# Patient Record
Sex: MALE | Race: WHITE | NOT HISPANIC OR LATINO | Employment: FULL TIME | URBAN - METROPOLITAN AREA
[De-identification: names, ages, dates, MRNs, and addresses within clinical notes are randomized per-mention and may not be internally consistent; named-entity substitution may affect disease eponyms.]

---

## 2017-05-02 ENCOUNTER — LAB VISIT (OUTPATIENT)
Dept: LAB | Facility: OTHER | Age: 27
End: 2017-05-02
Attending: FAMILY MEDICINE
Payer: COMMERCIAL

## 2017-05-02 ENCOUNTER — OFFICE VISIT (OUTPATIENT)
Dept: INTERNAL MEDICINE | Facility: CLINIC | Age: 27
End: 2017-05-02
Attending: FAMILY MEDICINE
Payer: COMMERCIAL

## 2017-05-02 VITALS
SYSTOLIC BLOOD PRESSURE: 122 MMHG | HEIGHT: 69 IN | DIASTOLIC BLOOD PRESSURE: 82 MMHG | BODY MASS INDEX: 23.22 KG/M2 | WEIGHT: 156.75 LBS

## 2017-05-02 DIAGNOSIS — L98.9 SKIN LESION: ICD-10-CM

## 2017-05-02 DIAGNOSIS — Z13.220 ENCOUNTER FOR LIPID SCREENING FOR CARDIOVASCULAR DISEASE: ICD-10-CM

## 2017-05-02 DIAGNOSIS — L70.9 ACNE, UNSPECIFIED ACNE TYPE: ICD-10-CM

## 2017-05-02 DIAGNOSIS — Z13.6 ENCOUNTER FOR LIPID SCREENING FOR CARDIOVASCULAR DISEASE: ICD-10-CM

## 2017-05-02 DIAGNOSIS — F98.8 ADD (ATTENTION DEFICIT DISORDER): ICD-10-CM

## 2017-05-02 DIAGNOSIS — Z00.00 ANNUAL PHYSICAL EXAM: ICD-10-CM

## 2017-05-02 DIAGNOSIS — Z00.00 ANNUAL PHYSICAL EXAM: Primary | ICD-10-CM

## 2017-05-02 LAB
25(OH)D3+25(OH)D2 SERPL-MCNC: 44 NG/ML
ALBUMIN SERPL BCP-MCNC: 4.6 G/DL
ALP SERPL-CCNC: 62 U/L
ALT SERPL W/O P-5'-P-CCNC: 22 U/L
ANION GAP SERPL CALC-SCNC: 8 MMOL/L
AST SERPL-CCNC: 16 U/L
BASOPHILS # BLD AUTO: 0.01 K/UL
BASOPHILS NFR BLD: 0.2 %
BILIRUB SERPL-MCNC: 1 MG/DL
BUN SERPL-MCNC: 13 MG/DL
CALCIUM SERPL-MCNC: 9.7 MG/DL
CHLORIDE SERPL-SCNC: 104 MMOL/L
CHOLEST/HDLC SERPL: 3 {RATIO}
CO2 SERPL-SCNC: 29 MMOL/L
CREAT SERPL-MCNC: 1 MG/DL
DIFFERENTIAL METHOD: ABNORMAL
EOSINOPHIL # BLD AUTO: 0.1 K/UL
EOSINOPHIL NFR BLD: 1.8 %
ERYTHROCYTE [DISTWIDTH] IN BLOOD BY AUTOMATED COUNT: 12.7 %
EST. GFR  (AFRICAN AMERICAN): >60 ML/MIN/1.73 M^2
EST. GFR  (NON AFRICAN AMERICAN): >60 ML/MIN/1.73 M^2
GLUCOSE SERPL-MCNC: 87 MG/DL
HCT VFR BLD AUTO: 45.1 %
HDL/CHOLESTEROL RATIO: 33.1 %
HDLC SERPL-MCNC: 175 MG/DL
HDLC SERPL-MCNC: 58 MG/DL
HGB BLD-MCNC: 15.7 G/DL
LDLC SERPL CALC-MCNC: 101.6 MG/DL
LYMPHOCYTES # BLD AUTO: 1.3 K/UL
LYMPHOCYTES NFR BLD: 24.3 %
MCH RBC QN AUTO: 31.8 PG
MCHC RBC AUTO-ENTMCNC: 34.8 %
MCV RBC AUTO: 92 FL
MONOCYTES # BLD AUTO: 0.4 K/UL
MONOCYTES NFR BLD: 7.2 %
NEUTROPHILS # BLD AUTO: 3.7 K/UL
NEUTROPHILS NFR BLD: 66.3 %
NONHDLC SERPL-MCNC: 117 MG/DL
PLATELET # BLD AUTO: 295 K/UL
PMV BLD AUTO: 9.4 FL
POTASSIUM SERPL-SCNC: 4.5 MMOL/L
PROT SERPL-MCNC: 8.1 G/DL
RBC # BLD AUTO: 4.93 M/UL
SODIUM SERPL-SCNC: 141 MMOL/L
TRIGL SERPL-MCNC: 77 MG/DL
WBC # BLD AUTO: 5.52 K/UL

## 2017-05-02 PROCEDURE — 86703 HIV-1/HIV-2 1 RESULT ANTBDY: CPT

## 2017-05-02 PROCEDURE — 99999 PR PBB SHADOW E&M-EST. PATIENT-LVL III: CPT | Mod: PBBFAC,,, | Performed by: FAMILY MEDICINE

## 2017-05-02 PROCEDURE — 80074 ACUTE HEPATITIS PANEL: CPT

## 2017-05-02 PROCEDURE — 80053 COMPREHEN METABOLIC PANEL: CPT

## 2017-05-02 PROCEDURE — 82306 VITAMIN D 25 HYDROXY: CPT

## 2017-05-02 PROCEDURE — 86695 HERPES SIMPLEX TYPE 1 TEST: CPT

## 2017-05-02 PROCEDURE — 80061 LIPID PANEL: CPT

## 2017-05-02 PROCEDURE — 86696 HERPES SIMPLEX TYPE 2 TEST: CPT | Mod: 59

## 2017-05-02 PROCEDURE — 87591 N.GONORRHOEAE DNA AMP PROB: CPT

## 2017-05-02 PROCEDURE — 99385 PREV VISIT NEW AGE 18-39: CPT | Mod: S$GLB,,, | Performed by: FAMILY MEDICINE

## 2017-05-02 PROCEDURE — 36415 COLL VENOUS BLD VENIPUNCTURE: CPT

## 2017-05-02 PROCEDURE — 86695 HERPES SIMPLEX TYPE 1 TEST: CPT | Mod: 59

## 2017-05-02 PROCEDURE — 85025 COMPLETE CBC W/AUTO DIFF WBC: CPT

## 2017-05-02 PROCEDURE — 86592 SYPHILIS TEST NON-TREP QUAL: CPT

## 2017-05-02 RX ORDER — LISDEXAMFETAMINE DIMESYLATE 30 MG/1
30 CAPSULE ORAL EVERY MORNING
Qty: 30 CAPSULE | Refills: 0 | Status: SHIPPED | OUTPATIENT
Start: 2017-05-02 | End: 2017-05-30 | Stop reason: SDUPTHER

## 2017-05-02 NOTE — PROGRESS NOTES
"Subjective:      Patient ID: Sheldon Sanon is a 26 y.o. male.    Chief Complaint: Establish Care and Exposure to STD    HPI  Review of Systems  I personally reviewed Past Medical History, Past Surgical history,  Past Social History and Family History    Objective:   Ht 5' 9" (1.753 m)  Wt 71.1 kg (156 lb 12 oz)  BMI 23.15 kg/m2    Physical Exam    Sheldon was seen today for establish care and exposure to std.    Diagnoses and all orders for this visit:    Encounter for lipid screening for cardiovascular disease  -     Lipid panel; Future      "

## 2017-05-02 NOTE — PROGRESS NOTES
"Subjective:      Patient ID: Sheldon Sanon is a 26 y.o. male.    Chief Complaint: Establish Care and Exposure to STD    HPI Comments: He has noticed a rash in his genital area, noticed a blister papule that was painful, in the last three days. He did pop it and another one came up that was similar. He denies fevers or malaise. In the last few weeks his mood has been good, no panic attacks, no SI or HI.     Exposure to STD   The patient's pertinent negatives include no penile discharge, penile pain, scrotal swelling or testicular pain. Pertinent negatives include no dysuria or flank pain.     Review of Systems   Constitutional: Negative.    Respiratory: Negative.    Cardiovascular: Negative.    Gastrointestinal: Negative.    Genitourinary: Positive for genital sores. Negative for discharge, dysuria, flank pain, penile pain, penile swelling, scrotal swelling and testicular pain.     I personally reviewed Past Medical History, Past Surgical history,  Past Social History and Family History    Objective:   /82  Ht 5' 9" (1.753 m)  Wt 71.1 kg (156 lb 12 oz)  BMI 23.15 kg/m2    Physical Exam   Constitutional: He is oriented to person, place, and time. He appears well-developed and well-nourished. No distress.   HENT:   Head: Normocephalic and atraumatic.   Right Ear: Hearing, tympanic membrane, external ear and ear canal normal.   Left Ear: Hearing, tympanic membrane, external ear and ear canal normal.   Mouth/Throat: Oropharynx is clear and moist. No oropharyngeal exudate.   Eyes: Conjunctivae and EOM are normal. Pupils are equal, round, and reactive to light.   Neck: Normal range of motion. Neck supple. No thyromegaly present.   Cardiovascular: Normal rate, regular rhythm, normal heart sounds and intact distal pulses.  Exam reveals no gallop and no friction rub.    No murmur heard.  Pulmonary/Chest: Effort normal and breath sounds normal. No respiratory distress. He has no wheezes. He has no rales. He " exhibits no tenderness.   Abdominal: Soft. Bowel sounds are normal. He exhibits no distension and no mass. There is no tenderness. There is no rebound and no guarding.   Musculoskeletal: Normal range of motion.   Neurological: He is alert and oriented to person, place, and time. No cranial nerve deficit.   Skin: Skin is warm and dry. He is not diaphoretic.   Left lower abdomen X2 excoriations   Psychiatric: He has a normal mood and affect. His behavior is normal. Judgment and thought content normal.   Vitals reviewed.      Sheldon was seen today for establish care and exposure to std.    Diagnoses and all orders for this visit:    Annual physical exam  Encounter for lipid screening for cardiovascular disease  -     Lipid panel; Future  -     CBC auto differential; Future  -     Comprehensive metabolic panel; Future  -     Vitamin D; Future  -     HIV-1 and HIV-2 antibodies; Future  -     RPR; Future  -     Hepatitis panel, acute; Future  -     C. trachomatis/N. gonorrhoeae by AMP DNA Urine  -     Ambulatory referral to Dermatology  -     Ambulatory consult to Dermatology  -     HERPES SIMPLEX 1 & 2 IGM; Future  -     HERPES SIMPLEX 1&2 IGG; Future    Acne, unspecified acne type  -stable on minocycline     Skin lesion  -     Ambulatory referral to Dermatology  -     Ambulatory consult to Dermatology  -     HERPES SIMPLEX 1 & 2 IGM; Future  -     HERPES SIMPLEX 1&2 IGG; Future    ADD (attention deficit disorder)  -     lisdexamfetamine (VYVANSE) 30 MG capsule; Take 1 capsule (30 mg total) by mouth every morning.  -diagnosis reviewed under media tab

## 2017-05-02 NOTE — MR AVS SNAPSHOT
[facility]  [address]  [place] [address]  Phone: [phone]  Fax: [phone]                  Sheldon Sanon   2017 1:40 PM   Office Visit    Description:  Male : 1990   Provider:  Regina Hoyos MD   Department:  [facility]           Reason for Visit     Establish Care     Exposure to STD           Diagnoses this Visit        Comments    Annual physical exam    -  Primary     Encounter for lipid screening for cardiovascular disease         Acne, unspecified acne type         Skin lesion         ADD (attention deficit disorder)                To Do List           Future Appointments        Provider Department Dept Phone    2017 2:45 PM LAB, SAME DAY [facility] [phone]      Goals (5 Years of Data)     None      Follow-Up and Disposition     Return in about 6 months (around 2017), or if symptoms worsen or fail to improve.       These Medications        Disp Refills Start End    lisdexamfetamine (VYVANSE) 30 MG capsule 30 capsule 0 2017     Take 1 capsule (30 mg total) by mouth every morning. - Oral    Pharmacy: [id number] - [place] [address] AT [facility]  #: [phone]         [facility]     [facility] Care Line -  Assistance  Unless otherwise directed by your provider, please contact [facility], our nurse care line that is available for  assistance.     Registered nurses in the [facility] provide: appointment scheduling, clinical advisement, health education, and other advisory services.  Call: [phone] (toll free)               Medications           Message regarding Medications     Verify the changes and/or additions to your medication regime listed below are the same as discussed with your clinician today.  If any of these changes or additions are incorrect, please notify your  "healthcare provider.             Verify that the below list of medications is an accurate representation of the medications you are currently taking.  If none reported, the list may be blank. If incorrect, please contact your healthcare provider. Carry this list with you in case of emergency.           Current Medications     minocycline (DYNACIN) 100 MG tablet Take 100 mg by mouth every 12 (twelve) hours.    lisdexamfetamine (VYVANSE) 30 MG capsule Take 1 capsule (30 mg total) by mouth every morning.           Clinical Reference Information           Your Vitals Were     BP Height Weight BMI       122/82 5' 9" (1.753 m) 71.1 kg (156 lb 12 oz) 23.15 kg/m2       Blood Pressure          Most Recent Value    BP  122/82      Allergies as of 5/2/2017     No Known Allergies      Immunizations Administered on Date of Encounter - 5/2/2017     None      Orders Placed During Today's Visit      Normal Orders This Visit    Ambulatory consult to Dermatology     Ambulatory referral to Dermatology     C. trachomatis/N. gonorrhoeae by AMP DNA Urine     Future Labs/Procedures Expected by Expires    CBC auto differential  5/2/2017 5/2/2018    Comprehensive metabolic panel  5/2/2017 5/2/2018    Hepatitis panel, acute  5/2/2017 7/1/2018    HERPES SIMPLEX 1 & 2 IGM  5/2/2017 7/1/2018    HERPES SIMPLEX 1&2 IGG  5/2/2017 7/1/2018    HIV-1 and HIV-2 antibodies  5/2/2017 5/2/2018    Lipid panel  5/2/2017 6/18/2018    RPR  5/2/2017 7/1/2018    Vitamin D  5/2/2017 5/2/2018      [facility] Sign-Up     Activating your [facility] account is as easy as 1-2-3!     1) Visit [email or web address], select Sign Up Now, enter this activation code and your date of birth, then select Next.  [email or web address]  Expires: 6/16/2017  2:22 PM      2) Create a username and password to use when you visit [facility] in the future and select a security question in case you lose your password and select Next.    3) Enter your e-mail address and click Sign Up!    Additional " Information  If you have questions, please e-mail [email or web address] or call [phone] to talk to our [facility] staff. Remember, [facility] is NOT to be used for urgent needs. For medical emergencies, dial 911.         Language Assistance Services     ATTENTION: Language assistance services are available, free of charge. Please call [phone].      ATENCIÓN: Si habla español, tiene a ellison disposición servicios gratuitos de asistencia lingüística. Llame al [phone].     [facility] Ý: N?u b?n nói Ti?ng Vi?t, có các d?ch v? h? tr? ngôn ng? mi?n phí dành cho b?n. G?i s? [phone].         [facility] complies with applicable Federal civil rights laws and does not discriminate on the basis of race, color, national origin, age, disability, or sex.

## 2017-05-03 LAB
C TRACH DNA SPEC QL NAA+PROBE: NOT DETECTED
HAV IGM SERPL QL IA: NEGATIVE
HBV CORE IGM SERPL QL IA: NEGATIVE
HBV SURFACE AG SERPL QL IA: NEGATIVE
HCV AB SERPL QL IA: NEGATIVE
HIV 1+2 AB+HIV1 P24 AG SERPL QL IA: NEGATIVE
N GONORRHOEA DNA SPEC QL NAA+PROBE: NOT DETECTED
RPR SER QL: NORMAL

## 2017-05-05 LAB
HSV1 IGG SERPL QL IA: NEGATIVE
HSV2 IGG SERPL QL IA: NEGATIVE

## 2017-05-08 ENCOUNTER — TELEPHONE (OUTPATIENT)
Dept: INTERNAL MEDICINE | Facility: CLINIC | Age: 27
End: 2017-05-08

## 2017-05-08 LAB — HSV1+2 IGM SER IA-ACNC: <0.9 INDEX

## 2017-05-08 NOTE — TELEPHONE ENCOUNTER
Please inform patient   You are negative for gonorrhea and chlamydia.   Your complete blood count is normal. You are not anemic.   Your liver, electrolytes, and kidney function are normal.   You are negative for HIV, syphilis, hepatitis A, B, and C and herpes.

## 2017-05-08 NOTE — TELEPHONE ENCOUNTER
----- Message from Solange Morton sent at 5/8/2017  9:13 AM CDT -----  Contact: pt  x_  1st Request  _  2nd Request  _  3rd Request      Who:pt     Why: calling in regards to Lab results,please advise pt     What Number to Call Back: [phone]    When to Expect a call back: (Before the end of the day)   -- if call after 3:00 call back will be tomorrow.

## 2017-05-09 ENCOUNTER — INITIAL CONSULT (OUTPATIENT)
Dept: DERMATOLOGY | Facility: CLINIC | Age: 27
End: 2017-05-09
Payer: COMMERCIAL

## 2017-05-09 ENCOUNTER — OFFICE VISIT (OUTPATIENT)
Dept: INTERNAL MEDICINE | Facility: CLINIC | Age: 27
End: 2017-05-09
Attending: FAMILY MEDICINE
Payer: COMMERCIAL

## 2017-05-09 VITALS
HEIGHT: 69 IN | WEIGHT: 156.5 LBS | DIASTOLIC BLOOD PRESSURE: 70 MMHG | HEART RATE: 81 BPM | BODY MASS INDEX: 23.18 KG/M2 | OXYGEN SATURATION: 98 % | SYSTOLIC BLOOD PRESSURE: 120 MMHG

## 2017-05-09 VITALS — BODY MASS INDEX: 23.04 KG/M2 | WEIGHT: 156 LBS

## 2017-05-09 DIAGNOSIS — R21 RASH: Primary | ICD-10-CM

## 2017-05-09 DIAGNOSIS — B02.9 HERPES ZOSTER WITHOUT COMPLICATION: Primary | ICD-10-CM

## 2017-05-09 PROCEDURE — 1160F RVW MEDS BY RX/DR IN RCRD: CPT | Mod: S$GLB,,, | Performed by: DERMATOLOGY

## 2017-05-09 PROCEDURE — 99999 PR PBB SHADOW E&M-EST. PATIENT-LVL II: CPT | Mod: PBBFAC,,, | Performed by: DERMATOLOGY

## 2017-05-09 PROCEDURE — 99202 OFFICE O/P NEW SF 15 MIN: CPT | Mod: S$GLB,,, | Performed by: DERMATOLOGY

## 2017-05-09 PROCEDURE — 87529 HSV DNA AMP PROBE: CPT

## 2017-05-09 PROCEDURE — 99999 PR PBB SHADOW E&M-EST. PATIENT-LVL III: CPT | Mod: PBBFAC,,, | Performed by: FAMILY MEDICINE

## 2017-05-09 PROCEDURE — 99213 OFFICE O/P EST LOW 20 MIN: CPT | Mod: S$GLB,,, | Performed by: FAMILY MEDICINE

## 2017-05-09 RX ORDER — TRIAMCINOLONE ACETONIDE 1 MG/G
CREAM TOPICAL 2 TIMES DAILY
Qty: 30 G | Refills: 0 | Status: SHIPPED | OUTPATIENT
Start: 2017-05-09 | End: 2018-12-04

## 2017-05-09 RX ORDER — CIPROFLOXACIN 500 MG/1
500 TABLET ORAL EVERY 12 HOURS
Qty: 6 TABLET | Refills: 0 | Status: SHIPPED | OUTPATIENT
Start: 2017-05-09 | End: 2017-05-12

## 2017-05-09 RX ORDER — MINOCYCLINE HYDROCHLORIDE 100 MG/1
CAPSULE ORAL
Refills: 1 | COMMUNITY
Start: 2017-05-01 | End: 2017-07-24 | Stop reason: SDUPTHER

## 2017-05-09 RX ORDER — LEVOCETIRIZINE DIHYDROCHLORIDE 5 MG/1
5 TABLET, FILM COATED ORAL NIGHTLY
Qty: 30 TABLET | Refills: 1 | Status: SHIPPED | OUTPATIENT
Start: 2017-05-09 | End: 2018-12-04

## 2017-05-09 RX ORDER — VALACYCLOVIR HYDROCHLORIDE 1 G/1
1000 TABLET, FILM COATED ORAL 3 TIMES DAILY
Qty: 21 TABLET | Refills: 0 | Status: SHIPPED | OUTPATIENT
Start: 2017-05-09 | End: 2017-05-16 | Stop reason: ALTCHOICE

## 2017-05-09 NOTE — PROGRESS NOTES
Subjective:       Patient ID:  Sheldon Sanon is a 26 y.o. male who presents for   Chief Complaint   Patient presents with    Rash     groin     HPI Comments: History of Present Illness: The patient presents with chief complaint of rash.  Location: groin  Duration: 10 days  Signs/Symptoms: itch    Prior treatments: none (RX s were not filled yet)    Seen 5/2:  HPI Comments: He has noticed a rash in his genital area, noticed a blister papule that was painful, in the last three days. He did pop it and another one came up that was similar. He denies fevers or malaise. In the last few weeks his mood has been good, no panic attacks, no SI or HI.   Exposure to STD   The patient's pertinent negatives include no penile discharge, penile pain, scrotal swelling or testicular pain. Pertinent negatives include no dysuria or flank pain.       Rash         Review of Systems   Constitutional: Negative for fever.   Skin: Positive for itching and rash.   Hematologic/Lymphatic: Does not bruise/bleed easily.        Objective:    Physical Exam   Skin:   Areas Examined (abnormalities noted in diagram):   Genitals / Buttocks / Groin Inspection Performed              Diagram Legend      See annotation      Assessment / Plan:        Herpes zoster without complication vs herpes simplex  -     valacyclovir (VALTREX) 1000 MG tablet; Take 1 tablet (1,000 mg total) by mouth 3 (three) times daily.  Dispense: 21 tablet; Refill: 0  -     Herpes Simplex Virus 1&2 PCR Non-Blood Skin    IgG and IgM were negative,( can repeat in 3 months if culture PCR is negative taken today)    Explained either condition is contagious, avoid contact with others until dx resolved

## 2017-05-09 NOTE — PROGRESS NOTES
"Subjective:      Patient ID: Sheldon Sanon is a 26 y.o. male.    Chief Complaint: Follow-up    HPI Comments: He is here for worsening rash of his groin. He has noticed spread of the rash in the left groin area. He denies pain or changes in body products.     Review of Systems   Constitutional: Negative.    Respiratory: Negative.    Cardiovascular: Negative.    Gastrointestinal: Negative.    Genitourinary: Negative.      I personally reviewed Past Medical History, Past Surgical history,  Past Social History and Family History    Objective:   /70  Pulse 81  Ht 5' 9" (1.753 m)  Wt 71 kg (156 lb 8.4 oz)  SpO2 98%  BMI 23.11 kg/m2    Physical Exam   Constitutional: He is oriented to person, place, and time. He appears well-developed and well-nourished. No distress.   HENT:   Head: Normocephalic and atraumatic.   Right Ear: Hearing, tympanic membrane, external ear and ear canal normal.   Left Ear: Hearing, tympanic membrane, external ear and ear canal normal.   Mouth/Throat: Oropharynx is clear and moist. No oropharyngeal exudate.   Eyes: Conjunctivae and EOM are normal. Pupils are equal, round, and reactive to light.   Neck: Normal range of motion. Neck supple. No thyromegaly present.   Cardiovascular: Normal rate, regular rhythm, normal heart sounds and intact distal pulses.  Exam reveals no gallop and no friction rub.    No murmur heard.  Pulmonary/Chest: Effort normal and breath sounds normal. No respiratory distress. He has no wheezes. He has no rales. He exhibits no tenderness.   Neurological: He is alert and oriented to person, place, and time. No cranial nerve deficit.   Skin: He is not diaphoretic.   Excoriations left groin area, see picture in media tab   Vitals reviewed.      Sheldon was seen today for follow-up.    Diagnoses and all orders for this visit:    Rash  -will start cipro and schedule follow up with derm for further evaluation   -negative HSV serologies   -     Ambulatory consult to " Dermatology    Other orders  -     levocetirizine (XYZAL) 5 MG tablet; Take 1 tablet (5 mg total) by mouth every evening.  -     triamcinolone acetonide 0.1% (KENALOG) 0.1 % cream; Apply topically 2 (two) times daily.  -     ciprofloxacin HCl (CIPRO) 500 MG tablet; Take 1 tablet (500 mg total) by mouth every 12 (twelve) hours.

## 2017-05-09 NOTE — LETTER
May 9, 2017      Regina Hoyos MD  [address]  [facility] [address]           [place] [facility]  [address] [facility] [address]  [place] [address]  Phone: [phone]  Fax: [phone]          Patient: Sheldon Sanon   MR Number: [id number]   YOB: 1990   Date of Visit: 5/9/2017       Dear Dr. Regina Hoyos:    Thank you for referring Sheldon Sanon to me for evaluation. Attached you will find relevant portions of my assessment and plan of care.    If you have questions, please do not hesitate to call me. I look forward to following Sheldon Sanon along with you.    Sincerely,    Ivory Walters MD    Enclosure  CC:  No Recipients    If you would like to receive this communication electronically, please contact [email or web address] or [phone] to request more information on [facility] Link access.    For providers and/or their staff who would like to refer a patient to [facility], please contact us through our one-stop-shop provider referral line, [facility], at [phone].    If you feel you have received this communication in error or would no longer like to receive these types of communications, please e-mail [email or web address]

## 2017-05-09 NOTE — MR AVS SNAPSHOT
[facility]  [address]  [facility] [address]  Phone: [phone]  Fax: [phone]                  Sheldon Sanon   2017 7:00 AM   Office Visit    Description:  Male : 1990   Provider:  Regina Hoyos MD   Department:  [facility]           Reason for Visit     Follow-up           Diagnoses this Visit        Comments    Rash    -  Primary            To Do List           Future Appointments        Provider Department Dept Phone    2017 8:20 AM Ivory Walters MD [place] [facility] [phone]      Goals (5 Years of Data)     None       These Medications        Disp Refills Start End    levocetirizine (XYZAL) 5 MG tablet 30 tablet 1 2017    Take 1 tablet (5 mg total) by mouth every evening. - Oral    Pharmacy: [facility] - [address] Ph #: [phone]       triamcinolone acetonide 0.1% (KENALOG) 0.1 % cream 30 g 0 2017    Apply topically 2 (two) times daily. - Topical (Top)    Pharmacy: [facility] - [address] Ph #: [phone]       ciprofloxacin HCl (CIPRO) 500 MG tablet 6 tablet 0 2017    Take 1 tablet (500 mg total) by mouth every 12 (twelve) hours. - Oral    Pharmacy: [facility] - [address] Ph #: [phone]         [facility]     [facility] Nurse Care Line -  Assistance  Unless otherwise directed by your provider, please contact [facility], our nurse care line that is available for  assistance.     Registered nurses in the [facility] provide: appointment scheduling, clinical advisement, health education, and other advisory services.  Call: [phone] (toll free)               Medications           Message regarding Medications     Verify the changes and/or additions to your medication regime listed below are the  "same as discussed with your clinician today.  If any of these changes or additions are incorrect, please notify your healthcare provider.        START taking these NEW medications        Refills    levocetirizine (XYZAL) 5 MG tablet 1    Sig: Take 1 tablet (5 mg total) by mouth every evening.    Class: Normal    Route: Oral    triamcinolone acetonide 0.1% (KENALOG) 0.1 % cream 0    Sig: Apply topically 2 (two) times daily.    Class: Normal    Route: Topical (Top)    ciprofloxacin HCl (CIPRO) 500 MG tablet 0    Sig: Take 1 tablet (500 mg total) by mouth every 12 (twelve) hours.    Class: Normal    Route: Oral           Verify that the below list of medications is an accurate representation of the medications you are currently taking.  If none reported, the list may be blank. If incorrect, please contact your healthcare provider. Carry this list with you in case of emergency.           Current Medications     lisdexamfetamine (VYVANSE) 30 MG capsule Take 1 capsule (30 mg total) by mouth every morning.    minocycline (DYNACIN) 100 MG tablet Take 100 mg by mouth every 12 (twelve) hours.    ciprofloxacin HCl (CIPRO) 500 MG tablet Take 1 tablet (500 mg total) by mouth every 12 (twelve) hours.    levocetirizine (XYZAL) 5 MG tablet Take 1 tablet (5 mg total) by mouth every evening.    triamcinolone acetonide 0.1% (KENALOG) 0.1 % cream Apply topically 2 (two) times daily.           Clinical Reference Information           Your Vitals Were     BP Pulse Height Weight SpO2 BMI    120/70 81 5' 9" (1.753 m) 71 kg (156 lb 8.4 oz) 98% 23.11 kg/m2      Blood Pressure          Most Recent Value    BP  120/70      Allergies as of 5/9/2017     No Known Allergies      Immunizations Administered on Date of Encounter - 5/9/2017     None      Orders Placed During Today's Visit      Normal Orders This Visit    Ambulatory consult to Dermatology       [facility] Sign-Up     Activating your [facility] account is as easy as 1-2-3!     1) Visit " [email or web address], select Sign Up Now, enter this activation code and your date of birth, then select Next.  [email or web address]  Expires: 6/16/2017  2:22 PM      2) Create a username and password to use when you visit [facility] in the future and select a security question in case you lose your password and select Next.    3) Enter your e-mail address and click Sign Up!    Additional Information  If you have questions, please e-mail [email or web address] or call [phone] to talk to our [facility] staff. Remember, [facility] is NOT to be used for urgent needs. For medical emergencies, dial 911.         Language Assistance Services     ATTENTION: Language assistance services are available, free of charge. Please call [phone].      ATENCIÓN: Si habla barry, tiene a ellison disposición servicios gratuitos de asistencia lingüística. Llame al [phone].     CHÚ Ý: N?u b?n nói Ti?ng Vi?t, có các d?ch v? h? tr? ngôn ng? mi?n phí dành cho b?n. G?i s? [phone].         [facility] complies with applicable Federal civil rights laws and does not discriminate on the basis of race, color, national origin, age, disability, or sex.

## 2017-05-09 NOTE — MR AVS SNAPSHOT
[facility]   [facility]  [place] [address]  Phone: [phone]  Fax: [phone]                  Sheldon Sanon   2017 8:20 AM   Initial consult    Description:  Male : 1990   Provider:  Ivory Walters MD   Department:  [facility]           Reason for Visit     Rash           Diagnoses this Visit        Comments    Herpes zoster without complication    -  Primary            To Do List           Goals (5 Years of Data)     None       These Medications        Disp Refills Start End    valacyclovir (VALTREX) 1000 MG tablet 21 tablet 0 2017    Take 1 tablet (1,000 mg total) by mouth 3 (three) times daily. - Oral    Pharmacy: [id number] - [place] [address] AT [facility]  #: [phone]         [facility] On Call     [facility] On Call Nurse Care Line -  Assistance  Unless otherwise directed by your provider, please contact [facility], our nurse care line that is available for  assistance.     Registered nurses in the [facility] provide: appointment scheduling, clinical advisement, health education, and other advisory services.  Call: [phone] (toll free)               Medications           Message regarding Medications     Verify the changes and/or additions to your medication regime listed below are the same as discussed with your clinician today.  If any of these changes or additions are incorrect, please notify your healthcare provider.        START taking these NEW medications        Refills    valacyclovir (VALTREX) 1000 MG tablet 0    Sig: Take 1 tablet (1,000 mg total) by mouth 3 (three) times daily.    Class: Normal    Route: Oral           Verify that the below list of medications is an accurate representation of the medications you are currently taking.  If none reported, the list may be blank. If incorrect, please contact your healthcare  provider. Carry this list with you in case of emergency.           Current Medications     lisdexamfetamine (VYVANSE) 30 MG capsule Take 1 capsule (30 mg total) by mouth every morning.    minocycline (DYNACIN) 100 MG tablet Take 100 mg by mouth every 12 (twelve) hours.    ciprofloxacin HCl (CIPRO) 500 MG tablet Take 1 tablet (500 mg total) by mouth every 12 (twelve) hours.    levocetirizine (XYZAL) 5 MG tablet Take 1 tablet (5 mg total) by mouth every evening.    minocycline (MINOCIN,DYNACIN) 100 MG capsule TK ONE C PO  QD PRN    triamcinolone acetonide 0.1% (KENALOG) 0.1 % cream Apply topically 2 (two) times daily.    valacyclovir (VALTREX) 1000 MG tablet Take 1 tablet (1,000 mg total) by mouth 3 (three) times daily.           Clinical Reference Information           Your Vitals Were     Weight BMI             70.8 kg (156 lb) 23.04 kg/m2         Allergies as of 5/9/2017     No Known Allergies      Immunizations Administered on Date of Encounter - 5/9/2017     None      [facility] Sign-Up     Activating your [facility] account is as easy as 1-2-3!     1) Visit "[email or web address], select Sign Up Now, enter this activation code and your date of birth, then select Next.  [email or web address]  Expires: 6/16/2017  2:22 PM      2) Create a username and password to use when you visit [facility] in the future and select a security question in case you lose your password and select Next.    3) Enter your e-mail address and click Sign Up!    Additional Information  If you have questions, please e-mail [email or web address] or call [phone] to talk to our [facility] staff. Remember, [facility] is NOT to be used for urgent needs. For medical emergencies, dial 911.         Language Assistance Services     ATTENTION: Language assistance services are available, free of charge. Please call [phone].      ATENCIÓN: Si habla español, tiene a ellison disposición servicios gratuitos de asistencia lingüística. Llame al [phone].     [facility]  Ý: N?u b?n nói Ti?ng Vi?t, có các d?ch v? h? tr? ngôn ng? mi?n phí dành cho b?n. G?i s? [phone].         [facility] complies with applicable Federal civil rights laws and does not discriminate on the basis of race, color, national origin, age, disability, or sex.

## 2017-05-11 LAB
HSV PCR SPECIMEN SOURCE: ABNORMAL
HSV1 PCR RESULT: DETECTED
HSV2 PCR RESULT: NOT DETECTED

## 2017-05-16 DIAGNOSIS — B00.9 HERPES SIMPLEX: Primary | ICD-10-CM

## 2017-05-16 RX ORDER — ACYCLOVIR 400 MG/1
400 TABLET ORAL 2 TIMES DAILY
Qty: 60 TABLET | Refills: 11 | Status: SHIPPED | OUTPATIENT
Start: 2017-05-16 | End: 2018-06-06 | Stop reason: SDUPTHER

## 2017-05-30 DIAGNOSIS — F98.8 ADD (ATTENTION DEFICIT DISORDER): ICD-10-CM

## 2017-05-31 RX ORDER — LISDEXAMFETAMINE DIMESYLATE 30 MG/1
30 CAPSULE ORAL EVERY MORNING
Qty: 30 CAPSULE | Refills: 0 | Status: SHIPPED | OUTPATIENT
Start: 2017-05-31 | End: 2017-07-05 | Stop reason: SDUPTHER

## 2017-07-05 DIAGNOSIS — F98.8 ADD (ATTENTION DEFICIT DISORDER): ICD-10-CM

## 2017-07-05 RX ORDER — LISDEXAMFETAMINE DIMESYLATE 30 MG/1
30 CAPSULE ORAL EVERY MORNING
Qty: 30 CAPSULE | Refills: 0 | Status: SHIPPED | OUTPATIENT
Start: 2017-07-05 | End: 2017-08-10 | Stop reason: SDUPTHER

## 2017-07-24 ENCOUNTER — PATIENT MESSAGE (OUTPATIENT)
Dept: INTERNAL MEDICINE | Facility: CLINIC | Age: 27
End: 2017-07-24

## 2017-07-27 RX ORDER — MINOCYCLINE HYDROCHLORIDE 100 MG/1
100 CAPSULE ORAL EVERY 12 HOURS
Qty: 180 CAPSULE | Refills: 2 | Status: SHIPPED | OUTPATIENT
Start: 2017-07-27 | End: 2018-12-04

## 2017-08-10 DIAGNOSIS — F98.8 ADD (ATTENTION DEFICIT DISORDER): ICD-10-CM

## 2017-08-10 RX ORDER — LISDEXAMFETAMINE DIMESYLATE 30 MG/1
30 CAPSULE ORAL EVERY MORNING
Qty: 30 CAPSULE | Refills: 0 | Status: SHIPPED | OUTPATIENT
Start: 2017-08-10 | End: 2023-11-08

## 2018-06-06 RX ORDER — ACYCLOVIR 400 MG/1
400 TABLET ORAL 2 TIMES DAILY
Qty: 60 TABLET | Refills: 11 | Status: SHIPPED | OUTPATIENT
Start: 2018-06-06 | End: 2019-07-30 | Stop reason: SDUPTHER

## 2018-12-04 ENCOUNTER — OFFICE VISIT (OUTPATIENT)
Dept: INTERNAL MEDICINE | Facility: CLINIC | Age: 28
End: 2018-12-04
Attending: FAMILY MEDICINE
Payer: COMMERCIAL

## 2018-12-04 VITALS
OXYGEN SATURATION: 99 % | DIASTOLIC BLOOD PRESSURE: 70 MMHG | BODY MASS INDEX: 25.11 KG/M2 | WEIGHT: 169.56 LBS | HEART RATE: 82 BPM | SYSTOLIC BLOOD PRESSURE: 100 MMHG | HEIGHT: 69 IN

## 2018-12-04 DIAGNOSIS — B00.9 HERPES: ICD-10-CM

## 2018-12-04 DIAGNOSIS — K12.0 ULCER APHTHOUS ORAL: Primary | ICD-10-CM

## 2018-12-04 PROCEDURE — 99214 OFFICE O/P EST MOD 30 MIN: CPT | Mod: S$GLB,,, | Performed by: FAMILY MEDICINE

## 2018-12-04 PROCEDURE — 99999 PR PBB SHADOW E&M-EST. PATIENT-LVL III: CPT | Mod: PBBFAC,,, | Performed by: FAMILY MEDICINE

## 2018-12-04 PROCEDURE — 3008F BODY MASS INDEX DOCD: CPT | Mod: CPTII,S$GLB,, | Performed by: FAMILY MEDICINE

## 2018-12-04 RX ORDER — LIDOCAINE HYDROCHLORIDE 20 MG/ML
SOLUTION OROPHARYNGEAL
Qty: 200 ML | Refills: 0 | Status: SHIPPED | OUTPATIENT
Start: 2018-12-04 | End: 2023-11-08

## 2018-12-04 RX ORDER — VALACYCLOVIR HYDROCHLORIDE 500 MG/1
500 TABLET, FILM COATED ORAL DAILY
Qty: 90 TABLET | Refills: 1 | Status: SHIPPED | OUTPATIENT
Start: 2018-12-04 | End: 2019-07-30

## 2018-12-04 RX ORDER — MULTIVIT,CALC,MINS/IRON/FOLIC 9MG-400MCG
2 TABLET ORAL 3 TIMES DAILY PRN
Qty: 60 TABLET | Refills: 1 | Status: SHIPPED | OUTPATIENT
Start: 2018-12-04 | End: 2023-11-08

## 2018-12-04 NOTE — PATIENT INSTRUCTIONS
Canker Sore    A canker sore (also called an aphthous ulcer) is a painful sore on the lining of the mouth. It is most painful during the first few days, and it lasts about 7 to 14 days before going away.  Causes  Canker sores are not cold sores or fever blisters. They are not contagious, so they are not spread by contact. The exact cause of canker sores is not clear, but there are a number of things that can trigger them in different people.  · Mild injury, such as biting the inside of the mouth, lip, or cheek, or dental procedures  · Stress  · Poor diet, or lack of certain nutrients, including B vitamins and iron  · Foods that can irritate the mouth, including tomatoes, citrus fruits, and some nuts (foods that are acidic or contain bitter substances called tannins)  · Irritating chemicals, such as those in some toothpastes and mouthwashes  · Certain chronic illnesses  Symptoms  Canker sores are found on the lining of the mouth. They can be inside the cheeks or lips, on the roof of the mouth, at the base of the gums, on the tongue, or in the back of the throat. Canker sores typically have these characteristics:  · Small, flat (not raised) sores  · Can be white or yellowish bumps that are red around the edges or have a red halo  · Usually small in size, roundish, and in groups  · Accompanied by pain or burning  Canker sores do not leave a scar. But they usually come back.  Home care  The goals of canker sore treatment are to decrease the pain, speed healing, and prevent recurrence. No single treatment works for everyone. Try a number of techniques to see what works best.  General care  · You may find that soft, easy-to-chew foods cause less pain. Use a straw to direct liquids away from the sore.  · Use a soft-bristle toothbrush, and brush your teeth gently.  · Avoid acidic, salty, or spicy foods.  · Avoid injuring the inside of your mouth, or scraping your existing canker sores, by avoiding crusty and crunchy foods  like french bread and chips.  Medicines  You can try over-the-counter medicines that cover the sores and numb them. This protects the sores while they heal and helps reduce pain.  Homemade rinses and solutions  You can use these solutions as mouth rinses. Spit them out after using them. You can also dab them on the sores. You can repeat these treatments as often as needed.  · Rinse your mouth with saltwater.  · Mix equal amounts of hydrogen peroxide and water. You can use it as a mouthwash or dab it on spots with a cotton swab. You can also add sodium bicarbonate to this to make a paste, and then dab it on spots.  Follow-up care  Follow up with your healthcare provider, or as advised.  · If a culture was done, you will be notified if the treatment needs to be changed. You can call as directed for the results.  Call 911  Contact emergency services if any of these occur:  · Trouble breathing  · Inability to swallow  · Extreme drowsiness or trouble awakening  · Fainting or loss of consciousness  · Rapid heart rate  · Seizure  · Stiff neck  When to seek medical advice  Call your healthcare provider right away if any of these occur:  · You have a fever of 100.4°F (38°C) or higher.  · You are pregnant.  · You just had surgery or another medical procedure, or you were just discharged from the hospital.  · You are unable to eat or swallow due to pain.  Date Last Reviewed: 7/30/2015  © 2695-4189 [employer]. [address] [place] [address], [place] [address]. All rights reserved. This information is not intended as a substitute for professional medical care. Always follow your healthcare professional's instructions.

## 2019-07-30 RX ORDER — ACYCLOVIR 400 MG/1
400 TABLET ORAL 2 TIMES DAILY
Qty: 60 TABLET | Refills: 11 | Status: CANCELLED | OUTPATIENT
Start: 2019-07-30 | End: 2020-07-29

## 2019-07-30 RX ORDER — ACYCLOVIR 400 MG/1
400 TABLET ORAL 2 TIMES DAILY
Qty: 60 TABLET | Refills: 11 | Status: SHIPPED | OUTPATIENT
Start: 2019-07-30 | End: 2020-09-09 | Stop reason: SDUPTHER

## 2020-09-10 RX ORDER — ACYCLOVIR 400 MG/1
400 TABLET ORAL 2 TIMES DAILY
Qty: 60 TABLET | Refills: 11 | Status: SHIPPED | OUTPATIENT
Start: 2020-09-10 | End: 2023-11-08 | Stop reason: SDUPTHER

## 2021-04-16 ENCOUNTER — PATIENT MESSAGE (OUTPATIENT)
Dept: RESEARCH | Facility: HOSPITAL | Age: 31
End: 2021-04-16

## 2022-01-31 NOTE — TELEPHONE ENCOUNTER
OPERATIVE REPORT    DATE OF SURGERY: 01/31/22     SURGEON: Roe Austin M.D.    1st ASSISTANT: Josie Mckeon PA-C.    2nd ASSISTANT: JASON Gonzalez.    ANESTHESIA: General endotracheal tube anesthesia.    ANESTHESIOLOGIST: Bhavesh Aguila MD.    PREOPERATIVE DIAGNOSIS: Right hip osteoarthritis.    POSTOPERATIVE DIAGNOSIS: Right hip osteoarthritis.    PROCEDURE PERFORMED: Right Total Hip Arthroplasty.    INDICATIONS: The patient is a 77 year old, who presented to me with severe and worsening hip pain. The patient was walking with a limp and function was severely affected. Non-operative treatment was maximized with medicines, activity modifications, therapy and intra-articular injection. Despite this treatment the patient continued with significant groin pain and limp that was affecting all daily activities. On examination, the patient walked with a limp and had a leg length discrepancy. There was decreased range of motion of the hip with a positive impingement sign and positive Stinchfield sign. The x-rays showed complete bone-on-bone articulation. There was  subchondral sclerosis and osteophyte formation. Because of severe and worsening hip pain despite adequate nonoperative treatment and x-rays and physical exam consistent with end-stage arthritis, I recommended total hip replacement. We discussed the risks and benefits of surgery. Specific risks discussed included, but were not limited to, infection, blood clots, dislocation, leg-length discrepancy, neurovascular problems, medical complications, failure of surgery, and need for further surgery. Consent was signed freely.    DESCRIPTION OF PROCEDURE: The patient was met in the preoperative holding area. All final questions were answered. Consents were finalized. Preoperative  antibiotics (2 gm IV Ancef) were given within 1 hour of skin incision. Right hip was identified and marked.    The patient was  Informed pt of lab results. Pt demonstrated verbal understanding of information and had no further questions or concerns at this time.      brought to the operating room, placed supine on the operating table and administered general anesthesia by the anesthesia staff. 1 gm of IV TXA was given.    The patient was then turned in the lateral position and positioned on the pegboard. All bony prominences were well padded. An axillary roll was placed. The operative lower extremity was then prepped and draped in normal sterile fashion. Time-out was done indicating correct patient, correct surgical site. Incision was planned over the lateral aspect of the hip and trochanter. The incision was made and taken down through subcutaneous tissue to the level of the fascia of gluteus sumanth. Hemostasis was obtained. The fascia was nicked distally and the gluteus sumanth was then bluntly dissected proximally in line with its fibers. The Charnley retractor was placed. The bursa was taken down off the posterior aspect of the hip. The piriformis was identified and taken down off bone for later repair. Capsulotomy was performed incorporating the capsule, short external rotators, and part of quadratus femoris into a single sleeve. Hemostasis was obtained. The hip was gently dislocated. A subcapital femoral neck cut was made and the head was removed. It was noted to be severely osteoarthritic. I then exposed the lesser trochanter, preserving the majority of the quadratus femoris and marked the definitive neck cut based off the lesser trochanter per preoperative templating. Femoral neck cut was made. The bone was removed. Retractors were placed exposing the acetabulum. The labrum was taken down off the rim circumferentially. Pulvinar and ligamentum teres was removed from the floor of the acetabulum. Sequential reaming commenced, moving up in size to a size 50, which got excellent peripheral bleeding bone and was down the true floor. The size 50 trial got an excellent fit. The final size 50 cup was opened and impacted into proper position. A single acetabular screw was placed.  A 20-degree lipped liner for 32 head was then impacted into a clean and dried socket. The anterior capsular tissue was injected with anesthetic cocktail.    Retractors were placed exposing the proximal femur. Some soft tissue was removed from inside the trochanter. A box osteotome opened the proximal femur. Charnley awl cannulated the canal. Sequential broaches were used up to size 5, which got excellent proximal fit and fill and excellent rotational stability. The hip was then trialed with a standard offset neck and a +0 head ball. The leg lengths were noted to be equal. The hip had excellent stability throughout range of motion on testing.    The trial components were then removed. The final stem was opened and seated properly. The hip was again trialed with the +0 head ball noting equal leg-lengths and excellent stability. The final head ball was opened and impacted onto a cleaned, dry trunnion and the hip was finally reduced.    An Irrisept soak was performed. The hip was then copiously irrigated with normal saline antibiotic solution by Pulsavac lavage. The posterior capsular tissues were injected with anesthetic cocktail. The posterior capsule and short external rotators were then repaired back to bone through drill holes in the trochanter using #2 Ethibond. The superior vertical limb of the capsule was repaired side to side with #1 Vicryl. The piriformis was repaired using #2 Ethibond. The bursa was approximated with #1 Vicryl. The joint was injected with 2 g of tranexamic acid in 40 mL total. The fascia was closed with #1 Vicryl in an interrupted and running fashion. Subcutaneous tissue was approximated with #1 Vicryl. Subcutaneous tissue was injected with anesthetic cocktail. The skin was closed with a combination of 2-0 Vicryl, running 4-0 Monocryl suture and Dermabond skin glue. Sterile dressings were placed. The patient was woken up, transferred to the cart and taken to the recovery room in stable  condition having tolerated the procedure. All needle, lap, and instrument counts were correct at the conclusion of case.    COMPLICATIONS: None.    ESTIMATED BLOOD LOSS: 150 mL.    IV FLUIDS: 1000 mL of crystalloid.    IMPLANTS: [employer] R3 size 50 acetabular shell with an acetabular screw and a 20-degree lipped liner for 32 head highly cross-linked polyethylene.  The stem is a [employer] size 5 standard offset with a 32 +0 Oxinium head ball.    ASSISTANT PARTICIPATION: I, Roe Austin MD, was present for and performed all critical parts of this operation. My assistants helped with patient positioning, retraction, and closure of the tissues from the subcutaneous layer to the skin only. I personally closed the deep fascial layers. They also applied the final dressings and supervised the safe transfer of the patient to the recovery room.    ADVANCED DIRECTIVES: Advanced directives were discussed with the patient and the appropriate forms were completed preoperatively.

## 2023-08-28 ENCOUNTER — OCCUPATIONAL HEALTH (OUTPATIENT)
Dept: URGENT CARE | Facility: CLINIC | Age: 33
End: 2023-08-28

## 2023-08-28 DIAGNOSIS — Z13.9 ENCOUNTER FOR SCREENING: Primary | ICD-10-CM

## 2023-08-28 LAB
BREATH ALCOHOL: 0
CTP QC/QA: YES
POC 5 PANEL DRUG SCREEN: ABNORMAL

## 2023-08-28 PROCEDURE — 80305 DRUG TEST PRSMV DIR OPT OBS: CPT | Mod: S$GLB,,, | Performed by: EMERGENCY MEDICINE

## 2023-08-28 PROCEDURE — 82075 ASSAY OF BREATH ETHANOL: CPT | Mod: S$GLB,,, | Performed by: EMERGENCY MEDICINE

## 2023-08-28 PROCEDURE — 80305 POCT RAPID DRUG SCREEN 5 PANEL: ICD-10-PCS | Mod: S$GLB,,, | Performed by: EMERGENCY MEDICINE

## 2023-08-28 PROCEDURE — 82075 POCT BREATH ALCOHOL TEST: ICD-10-PCS | Mod: S$GLB,,, | Performed by: EMERGENCY MEDICINE

## 2023-11-07 NOTE — PROGRESS NOTES
"  Subjective:     Patient ID: Sheldon Sanon is a 33 y.o. male.   Chief Complaint: Establish Care    HPI:  Pt presents to establish care with new PCP. Needing refills today. Also wanting to discuss L hip/leg pain as well as some GI concerns.    Pt recently established care with an off-site testosterone clinic. Meds have been updated to reflect this. He reports he had labs done through them recently, which included fasting labs. He will bring those results to be added to his record.    Previous PCP: None  Last visit with PCP: "Years ago"  Reason for last visit: N/A    Annual Physical/Wellness Visit within last year: No    Review of Problems & History:  Patient Active Problem List   Diagnosis    ADHD    Anxiety    Contact with and (suspected) exposure to other viral communicable diseases    Depression    Low testosterone in male    Left hip pain    Bowel habit changes      #ADHD  Currently taking Adderall XR 10mg daily, reports he has been rx'd the option to take a second dose if needed (total of 20mg for the day)  He needs a refill of Adderall XR 10mg today  Previously was taking Vyvanse but has not used this in years    #Anxiety & Depression  Currently taking Effexor 37.5mg daily and Wellbutrin XL 300mg daily, tolerating well  Needs refills of both  Asking for referral to psychiatry for medication management and therapy    #Suspected HSV  Currently on chronic acyclovir for presumed HSV exposure/infection  No recent outbreaks, tolerating medication well  Past Medical History:   Diagnosis Date    Acne     ADD (attention deficit disorder)       Past Surgical History:   Procedure Laterality Date    none        Social History     Socioeconomic History    Marital status: Single   Occupational History    Occupation: real estate    Tobacco Use    Smoking status: Never    Smokeless tobacco: Never   Substance and Sexual Activity    Alcohol use: Yes     Alcohol/week: 7.0 - 10.0 standard drinks of alcohol     Types: 7 - " "10 Standard drinks or equivalent per week    Drug use: Yes     Types: Marijuana     Comment: monthly     Sexual activity: Yes     Partners: Female     Birth control/protection: Condom      Health Maintenance:  Colon Cancer Screening  Screening not indicated by patient's age & family history  Lung Cancer Screening  Never smoker  Prostate Cancer Screening  Not indicated by age or history  ASCVD Risk    The 2019 ASCVD risk score is only valid for ages 40 to 79   Lab Results   Component Value Date    CHOL 175 05/02/2017    HDL 58 05/02/2017    TRIG 77 05/02/2017   Statin rx: no    Diet  The patient has been trying to lose weight through a healthy diet and exercise program.  in general, a "healthy" diet  . Previously using pre-made meal service, but is shifting towards home cooking and meal prep.  Sexual Health  single partner, contraception - condoms most of the time  Vaccines  Seasonal Influenza: Due for booster  COVID-19: Due for booster  RSV: Not indicated  Tetanus: UTD  Shingles: Not indicated  Pneumococcal: Not indicated    Medication Review:    Current Outpatient Medications:     CHORIONIC GONADOTROPIN, HUMAN INJ, Inject 0.5 mLs into the skin twice a week., Disp: , Rfl:     testosterone cypionate (DEPOTESTOTERONE CYPIONATE) 100 mg/mL injection, Inject 80 mg into the muscle twice a week., Disp: , Rfl:     acyclovir (ZOVIRAX) 400 MG tablet, Take 1 tablet (400 mg total) by mouth 2 (two) times daily., Disp: 60 tablet, Rfl: 11    buPROPion (WELLBUTRIN XL) 300 MG 24 hr tablet, Take 1 tablet (300 mg total) by mouth once daily., Disp: 90 tablet, Rfl: 3    dextroamphetamine-amphetamine (ADDERALL XR) 10 MG 24 hr capsule, Take 1 capsule (10 mg total) by mouth once daily., Disp: 90 capsule, Rfl: 0    sars-cov-2, covid-19, (SPIKEVAX, MODERNA,, 12YRS AND UP 2023,) 50 mcg/0.5 mL injection, Inject 0.5 mLs into the muscle once. Inject 0.5 mL into the muscle once. for 1 dose, Disp: 0.5 mL, Rfl: 0    venlafaxine (EFFEXOR-XR) 37.5 MG " "24 hr capsule, Take 1 capsule (37.5 mg total) by mouth once daily., Disp: 90 capsule, Rfl: 3     Acute Concerns:  #GI concerns  Reports that he is "perpetually gassy" over an extended period of time  In the past 2 years he noticed he has had an increased number of daily bowel movements, typically up to 5x/day  Reports they are of normal consistency, but they vary in amount of volume  Denies abdominal pain, nausea/vomiting, jovany blood or unusual coloration  Says he feels the urge to have a bowel movement soon after eating  Denies fecal urgency  Has tried some elimination of suspected foods in his diet - primarily cutting out dairy, though he has not noticed much of a change  No known personal or family history of food intolerance    #Left hip/leg  Reports chronic discomfort in the L hip joint  Unsure of exactly when sx started (years)  Reports he had a hyperextension injury of the L leg/knee while playing soccer est 10 years ago  Left leg has a tendency to turn outward at baseline  Feels strain in hip joint on L  Has tried physical therapy without much significant benefit. He says the only thing that has truly been helpful was doing twice daily yoga over the span of a month while in [place], but he acknowledges that this is not something he is able to sustain    Review of Systems   Constitutional:  Negative for activity change, appetite change, chills, fatigue, fever and unexpected weight change.   HENT:  Negative for nasal congestion, ear pain and sore throat.    Eyes:  Negative for visual disturbance.   Respiratory:  Negative for cough and shortness of breath.    Cardiovascular:  Negative for chest pain.   Gastrointestinal:  Positive for change in bowel habit. Negative for abdominal distention, abdominal pain, anal bleeding, blood in stool, constipation, diarrhea, nausea, rectal pain, vomiting, reflux and fecal incontinence.   Genitourinary:  Negative for dysuria and frequency.   Musculoskeletal:  Positive for " "arthralgias. Negative for back pain, gait problem, joint swelling, leg pain, myalgias and joint deformity.   Integumentary:  Negative for rash.   Neurological:  Negative for weakness and headaches.   Psychiatric/Behavioral:  Negative for dysphoric mood and suicidal ideas. The patient is not nervous/anxious.    All other systems reviewed and are negative.         Objective:      Vitals:    11/08/23 0934   BP: 104/80   BP Location: Left arm   Patient Position: Sitting   BP Method: Large (Manual)   Pulse: 95   SpO2: 97%   Weight: 93.1 kg (205 lb 4 oz)   Height: 5' 9" (1.753 m)      Physical Exam  Vitals and nursing note reviewed.   Constitutional:       General: He is not in acute distress.     Appearance: Normal appearance. He is not ill-appearing.   HENT:      Head: Normocephalic and atraumatic.      Right Ear: Tympanic membrane, ear canal and external ear normal. There is no impacted cerumen.      Left Ear: Tympanic membrane, ear canal and external ear normal. There is no impacted cerumen.      Nose: Nose normal. No congestion or rhinorrhea.      Mouth/Throat:      Mouth: Mucous membranes are moist.      Pharynx: Oropharynx is clear. No oropharyngeal exudate or posterior oropharyngeal erythema.   Eyes:      General: No scleral icterus.        Right eye: No discharge.         Left eye: No discharge.      Conjunctiva/sclera: Conjunctivae normal.   Cardiovascular:      Rate and Rhythm: Normal rate and regular rhythm.      Pulses: Normal pulses.      Heart sounds: Normal heart sounds. No murmur heard.     No friction rub. No gallop.   Pulmonary:      Effort: Pulmonary effort is normal. No respiratory distress.      Breath sounds: Normal breath sounds. No wheezing, rhonchi or rales.   Abdominal:      General: Abdomen is flat. Bowel sounds are normal. There is no distension.      Palpations: Abdomen is soft. There is no mass.      Tenderness: There is no abdominal tenderness. There is no guarding.   Musculoskeletal:       "   General: No swelling, tenderness, deformity or signs of injury. Normal range of motion.      Cervical back: Normal range of motion and neck supple. No tenderness.      Right hip: Normal. No deformity, tenderness, bony tenderness or crepitus. Normal range of motion. Normal strength.      Left hip: Normal. No deformity, tenderness, bony tenderness or crepitus. Normal range of motion.   Lymphadenopathy:      Cervical: No cervical adenopathy.   Skin:     General: Skin is warm and dry.   Neurological:      General: No focal deficit present.      Mental Status: He is alert and oriented to person, place, and time. Mental status is at baseline.      Motor: No weakness.      Gait: Gait normal.      Deep Tendon Reflexes: Reflexes normal.   Psychiatric:         Mood and Affect: Mood normal.         Behavior: Behavior normal.         Thought Content: Thought content normal.         Judgment: Judgment normal.           Assessment:       Problem List Items Addressed This Visit          Psychiatric    ADHD (Chronic)    Relevant Medications    dextroamphetamine-amphetamine (ADDERALL XR) 10 MG 24 hr capsule    Anxiety    Relevant Medications    buPROPion (WELLBUTRIN XL) 300 MG 24 hr tablet    venlafaxine (EFFEXOR-XR) 37.5 MG 24 hr capsule    Other Relevant Orders    Ambulatory referral/consult to Psychiatry    Depression    Relevant Medications    buPROPion (WELLBUTRIN XL) 300 MG 24 hr tablet    venlafaxine (EFFEXOR-XR) 37.5 MG 24 hr capsule    Other Relevant Orders    Ambulatory referral/consult to Psychiatry       ID    Contact with and (suspected) exposure to other viral communicable diseases    Relevant Medications    acyclovir (ZOVIRAX) 400 MG tablet       Endocrine    Low testosterone in male       GI    Bowel habit changes       Orthopedic    Left hip pain    Relevant Orders    Ambulatory referral/consult to Sports Medicine     Other Visit Diagnoses       Encounter to establish care with new doctor    -  Primary     Encounter for annual general medical examination without abnormal findings in adult                  Plan:       1. Encounter to establish care with new doctor  Reviewed chronic medical conditions, updated problem list and medication list    2. Encounter for annual general medical examination without abnormal findings in adult  Will review outside labs and determine if additional labs are needed for health maintenance  Pt to receive flu shot today, will arrange appt for COVID booster  All other health maintenance UTD for age and hx    3. Bowel habit changes  Etiology not obvious at this time  Potentially conditioned colic reflex  Also consider food intolerance  Advised pt to keep track of sx and food diary to explore any patterns  Consider GI vs nutritionist eval to explore futher  Reassured by no pain, no blood, consistently normal (if frequent) BMs    4. Left hip pain  No red flags on exam or in history  Possibly chronic wear and tear type injury to the hip joint  Referring to Sports Med at pt's request for definitive evaluation; deferring decision to re-engage with PT to the specialist  -     Ambulatory referral/consult to Sports Medicine; Future; Expected date: 11/15/2023    5. Attention deficit hyperactivity disorder (ADHD), unspecified ADHD type  Reviewed prior tx hx  Refill Adderall XR 10mg (no change in therapy)  Advised pt on refill policy  -     dextroamphetamine-amphetamine (ADDERALL XR) 10 MG 24 hr capsule; Take 1 capsule (10 mg total) by mouth once daily.  Dispense: 90 capsule; Refill: 0    6. Anxiety  Chronic condition, stable.  Refill medications as listed, no changes to dose  Referral ordered for Psychiatry for medication and therapeutic management  -     buPROPion (WELLBUTRIN XL) 300 MG 24 hr tablet; Take 1 tablet (300 mg total) by mouth once daily.  Dispense: 90 tablet; Refill: 3  -     venlafaxine (EFFEXOR-XR) 37.5 MG 24 hr capsule; Take 1 capsule (37.5 mg total) by mouth once daily.  Dispense: 90  capsule; Refill: 3  -     Ambulatory referral/consult to Psychiatry; Future; Expected date: 11/15/2023    7. Depression, unspecified depression type  As above with Anxiety plan of care  -     buPROPion (WELLBUTRIN XL) 300 MG 24 hr tablet; Take 1 tablet (300 mg total) by mouth once daily.  Dispense: 90 tablet; Refill: 3  -     venlafaxine (EFFEXOR-XR) 37.5 MG 24 hr capsule; Take 1 capsule (37.5 mg total) by mouth once daily.  Dispense: 90 capsule; Refill: 3  -     Ambulatory referral/consult to Psychiatry; Future; Expected date: 11/15/2023    8. Contact with and (suspected) exposure to other viral communicable diseases  Refill acyclovir, no changes in therapy  -     acyclovir (ZOVIRAX) 400 MG tablet; Take 1 tablet (400 mg total) by mouth 2 (two) times daily.  Dispense: 60 tablet; Refill: 11    9. Low testosterone in male  Pt established externally, dosing to be managed by off-site provider  Will correlate with labs performed here to monitor efficacy and safety    Other orders  -     Influenza - Quadrivalent (PF)           Follow up in about 1 year (around 11/8/2024).     Danilo Bolden MD, FAAFP  Family Medicine Physician  [facility]  11/08/2023

## 2023-11-08 ENCOUNTER — PATIENT MESSAGE (OUTPATIENT)
Dept: INTERNAL MEDICINE | Facility: CLINIC | Age: 33
End: 2023-11-08

## 2023-11-08 ENCOUNTER — OFFICE VISIT (OUTPATIENT)
Dept: INTERNAL MEDICINE | Facility: CLINIC | Age: 33
End: 2023-11-08
Payer: COMMERCIAL

## 2023-11-08 VITALS
WEIGHT: 205.25 LBS | SYSTOLIC BLOOD PRESSURE: 104 MMHG | HEIGHT: 69 IN | BODY MASS INDEX: 30.4 KG/M2 | OXYGEN SATURATION: 97 % | HEART RATE: 95 BPM | DIASTOLIC BLOOD PRESSURE: 80 MMHG

## 2023-11-08 DIAGNOSIS — Z00.00 ENCOUNTER FOR ANNUAL GENERAL MEDICAL EXAMINATION WITHOUT ABNORMAL FINDINGS IN ADULT: ICD-10-CM

## 2023-11-08 DIAGNOSIS — R19.4 BOWEL HABIT CHANGES: ICD-10-CM

## 2023-11-08 DIAGNOSIS — R79.89 LOW TESTOSTERONE IN MALE: ICD-10-CM

## 2023-11-08 DIAGNOSIS — F41.9 ANXIETY: ICD-10-CM

## 2023-11-08 DIAGNOSIS — M25.552 LEFT HIP PAIN: ICD-10-CM

## 2023-11-08 DIAGNOSIS — F32.A DEPRESSION, UNSPECIFIED DEPRESSION TYPE: ICD-10-CM

## 2023-11-08 DIAGNOSIS — Z20.828 CONTACT WITH AND (SUSPECTED) EXPOSURE TO OTHER VIRAL COMMUNICABLE DISEASES: ICD-10-CM

## 2023-11-08 DIAGNOSIS — F90.9 ATTENTION DEFICIT HYPERACTIVITY DISORDER (ADHD), UNSPECIFIED ADHD TYPE: ICD-10-CM

## 2023-11-08 DIAGNOSIS — Z76.89 ENCOUNTER TO ESTABLISH CARE WITH NEW DOCTOR: Primary | ICD-10-CM

## 2023-11-08 PROCEDURE — 3008F PR BODY MASS INDEX (BMI) DOCUMENTED: ICD-10-PCS | Mod: CPTII,S$GLB,, | Performed by: FAMILY MEDICINE

## 2023-11-08 PROCEDURE — 99385 PR PREVENTIVE VISIT,NEW,18-39: ICD-10-PCS | Mod: 25,S$GLB,, | Performed by: FAMILY MEDICINE

## 2023-11-08 PROCEDURE — 3008F BODY MASS INDEX DOCD: CPT | Mod: CPTII,S$GLB,, | Performed by: FAMILY MEDICINE

## 2023-11-08 PROCEDURE — 99999 PR PBB SHADOW E&M-EST. PATIENT-LVL V: CPT | Mod: PBBFAC,,, | Performed by: FAMILY MEDICINE

## 2023-11-08 PROCEDURE — 3079F DIAST BP 80-89 MM HG: CPT | Mod: CPTII,S$GLB,, | Performed by: FAMILY MEDICINE

## 2023-11-08 PROCEDURE — 99385 PREV VISIT NEW AGE 18-39: CPT | Mod: 25,S$GLB,, | Performed by: FAMILY MEDICINE

## 2023-11-08 PROCEDURE — 1159F MED LIST DOCD IN RCRD: CPT | Mod: CPTII,S$GLB,, | Performed by: FAMILY MEDICINE

## 2023-11-08 PROCEDURE — 90471 FLU VACCINE (QUAD) GREATER THAN OR EQUAL TO 3YO PRESERVATIVE FREE IM: ICD-10-PCS | Mod: S$GLB,,, | Performed by: FAMILY MEDICINE

## 2023-11-08 PROCEDURE — 99999 PR PBB SHADOW E&M-EST. PATIENT-LVL V: ICD-10-PCS | Mod: PBBFAC,,, | Performed by: FAMILY MEDICINE

## 2023-11-08 PROCEDURE — 90471 IMMUNIZATION ADMIN: CPT | Mod: S$GLB,,, | Performed by: FAMILY MEDICINE

## 2023-11-08 PROCEDURE — 90686 IIV4 VACC NO PRSV 0.5 ML IM: CPT | Mod: S$GLB,,, | Performed by: FAMILY MEDICINE

## 2023-11-08 PROCEDURE — 3079F PR MOST RECENT DIASTOLIC BLOOD PRESSURE 80-89 MM HG: ICD-10-PCS | Mod: CPTII,S$GLB,, | Performed by: FAMILY MEDICINE

## 2023-11-08 PROCEDURE — 90686 FLU VACCINE (QUAD) GREATER THAN OR EQUAL TO 3YO PRESERVATIVE FREE IM: ICD-10-PCS | Mod: S$GLB,,, | Performed by: FAMILY MEDICINE

## 2023-11-08 PROCEDURE — 1160F RVW MEDS BY RX/DR IN RCRD: CPT | Mod: CPTII,S$GLB,, | Performed by: FAMILY MEDICINE

## 2023-11-08 PROCEDURE — 1159F PR MEDICATION LIST DOCUMENTED IN MEDICAL RECORD: ICD-10-PCS | Mod: CPTII,S$GLB,, | Performed by: FAMILY MEDICINE

## 2023-11-08 PROCEDURE — 1160F PR REVIEW ALL MEDS BY PRESCRIBER/CLIN PHARMACIST DOCUMENTED: ICD-10-PCS | Mod: CPTII,S$GLB,, | Performed by: FAMILY MEDICINE

## 2023-11-08 PROCEDURE — 3074F PR MOST RECENT SYSTOLIC BLOOD PRESSURE < 130 MM HG: ICD-10-PCS | Mod: CPTII,S$GLB,, | Performed by: FAMILY MEDICINE

## 2023-11-08 PROCEDURE — 3074F SYST BP LT 130 MM HG: CPT | Mod: CPTII,S$GLB,, | Performed by: FAMILY MEDICINE

## 2023-11-08 RX ORDER — VENLAFAXINE HYDROCHLORIDE 37.5 MG/1
37.5 CAPSULE, EXTENDED RELEASE ORAL DAILY
Qty: 90 CAPSULE | Refills: 3 | Status: SHIPPED | OUTPATIENT
Start: 2023-11-08 | End: 2024-03-18 | Stop reason: SDUPTHER

## 2023-11-08 RX ORDER — ACYCLOVIR 400 MG/1
400 TABLET ORAL 2 TIMES DAILY
Qty: 60 TABLET | Refills: 11 | Status: SHIPPED | OUTPATIENT
Start: 2023-11-08 | End: 2024-11-07

## 2023-11-08 RX ORDER — VENLAFAXINE HYDROCHLORIDE 37.5 MG/1
37.5 CAPSULE, EXTENDED RELEASE ORAL DAILY
COMMUNITY
Start: 2021-01-08 | End: 2023-11-08 | Stop reason: SDUPTHER

## 2023-11-08 RX ORDER — TESTOSTERONE CYPIONATE 1000 MG/10ML
80 INJECTION, SOLUTION INTRAMUSCULAR
COMMUNITY

## 2023-11-08 RX ORDER — DEXTROAMPHETAMINE SACCHARATE, AMPHETAMINE ASPARTATE MONOHYDRATE, DEXTROAMPHETAMINE SULFATE AND AMPHETAMINE SULFATE 2.5; 2.5; 2.5; 2.5 MG/1; MG/1; MG/1; MG/1
10 CAPSULE, EXTENDED RELEASE ORAL DAILY
Qty: 90 CAPSULE | Refills: 0 | Status: SHIPPED | OUTPATIENT
Start: 2023-11-08 | End: 2024-01-21 | Stop reason: SDUPTHER

## 2023-11-08 RX ORDER — BUPROPION HYDROCHLORIDE 300 MG/1
300 TABLET ORAL DAILY
Qty: 90 TABLET | Refills: 3 | Status: SHIPPED | OUTPATIENT
Start: 2023-11-08 | End: 2024-03-18 | Stop reason: SDUPTHER

## 2023-11-08 RX ORDER — DEXTROAMPHETAMINE SACCHARATE, AMPHETAMINE ASPARTATE MONOHYDRATE, DEXTROAMPHETAMINE SULFATE AND AMPHETAMINE SULFATE 2.5; 2.5; 2.5; 2.5 MG/1; MG/1; MG/1; MG/1
10 CAPSULE, EXTENDED RELEASE ORAL DAILY
COMMUNITY
End: 2023-11-08 | Stop reason: SDUPTHER

## 2023-11-08 RX ORDER — BUPROPION HYDROCHLORIDE 300 MG/1
300 TABLET ORAL DAILY
COMMUNITY
Start: 2021-01-08 | End: 2023-11-08 | Stop reason: SDUPTHER

## 2023-11-08 NOTE — PATIENT INSTRUCTIONS
Bring labs to put into records when able  Rx sent to [facility]  Referral to Sports Med, Psychiatry  Keep food log/symptoms diary

## 2023-11-09 ENCOUNTER — OFFICE VISIT (OUTPATIENT)
Dept: SPORTS MEDICINE | Facility: CLINIC | Age: 33
End: 2023-11-09
Payer: COMMERCIAL

## 2023-11-09 ENCOUNTER — HOSPITAL ENCOUNTER (OUTPATIENT)
Dept: RADIOLOGY | Facility: HOSPITAL | Age: 33
Discharge: HOME | End: 2023-11-09
Attending: ORTHOPAEDIC SURGERY
Payer: COMMERCIAL

## 2023-11-09 VITALS
HEART RATE: 99 BPM | WEIGHT: 205 LBS | HEIGHT: 69 IN | DIASTOLIC BLOOD PRESSURE: 76 MMHG | SYSTOLIC BLOOD PRESSURE: 131 MMHG | BODY MASS INDEX: 30.36 KG/M2

## 2023-11-09 DIAGNOSIS — M25.552 LEFT HIP PAIN: ICD-10-CM

## 2023-11-09 DIAGNOSIS — M25.859 FEMORAL ACETABULAR IMPINGEMENT: Primary | ICD-10-CM

## 2023-11-09 PROCEDURE — 3075F SYST BP GE 130 - 139MM HG: CPT | Mod: CPTII,S$GLB,, | Performed by: ORTHOPAEDIC SURGERY

## 2023-11-09 PROCEDURE — 3078F PR MOST RECENT DIASTOLIC BLOOD PRESSURE < 80 MM HG: ICD-10-PCS | Mod: CPTII,S$GLB,, | Performed by: ORTHOPAEDIC SURGERY

## 2023-11-09 PROCEDURE — 99999 PR PBB SHADOW E&M-EST. PATIENT-LVL IV: CPT | Mod: PBBFAC,,, | Performed by: ORTHOPAEDIC SURGERY

## 2023-11-09 PROCEDURE — 73502 XR HIP WITH PELVIS WHEN PERFORMED, 2 OR 3 VIEWS LEFT: ICD-10-PCS | Mod: 26,LT,, | Performed by: RADIOLOGY

## 2023-11-09 PROCEDURE — 3078F DIAST BP <80 MM HG: CPT | Mod: CPTII,S$GLB,, | Performed by: ORTHOPAEDIC SURGERY

## 2023-11-09 PROCEDURE — 99999 PR PBB SHADOW E&M-EST. PATIENT-LVL IV: ICD-10-PCS | Mod: PBBFAC,,, | Performed by: ORTHOPAEDIC SURGERY

## 2023-11-09 PROCEDURE — 1159F PR MEDICATION LIST DOCUMENTED IN MEDICAL RECORD: ICD-10-PCS | Mod: CPTII,S$GLB,, | Performed by: ORTHOPAEDIC SURGERY

## 2023-11-09 PROCEDURE — 99204 PR OFFICE/OUTPT VISIT, NEW, LEVL IV, 45-59 MIN: ICD-10-PCS | Mod: S$GLB,,, | Performed by: ORTHOPAEDIC SURGERY

## 2023-11-09 PROCEDURE — 1160F PR REVIEW ALL MEDS BY PRESCRIBER/CLIN PHARMACIST DOCUMENTED: ICD-10-PCS | Mod: CPTII,S$GLB,, | Performed by: ORTHOPAEDIC SURGERY

## 2023-11-09 PROCEDURE — 73502 X-RAY EXAM HIP UNI 2-3 VIEWS: CPT | Mod: TC,LT

## 2023-11-09 PROCEDURE — 3008F PR BODY MASS INDEX (BMI) DOCUMENTED: ICD-10-PCS | Mod: CPTII,S$GLB,, | Performed by: ORTHOPAEDIC SURGERY

## 2023-11-09 PROCEDURE — 3008F BODY MASS INDEX DOCD: CPT | Mod: CPTII,S$GLB,, | Performed by: ORTHOPAEDIC SURGERY

## 2023-11-09 PROCEDURE — 3075F PR MOST RECENT SYSTOLIC BLOOD PRESS GE 130-139MM HG: ICD-10-PCS | Mod: CPTII,S$GLB,, | Performed by: ORTHOPAEDIC SURGERY

## 2023-11-09 PROCEDURE — 99204 OFFICE O/P NEW MOD 45 MIN: CPT | Mod: S$GLB,,, | Performed by: ORTHOPAEDIC SURGERY

## 2023-11-09 PROCEDURE — 1159F MED LIST DOCD IN RCRD: CPT | Mod: CPTII,S$GLB,, | Performed by: ORTHOPAEDIC SURGERY

## 2023-11-09 PROCEDURE — 1160F RVW MEDS BY RX/DR IN RCRD: CPT | Mod: CPTII,S$GLB,, | Performed by: ORTHOPAEDIC SURGERY

## 2023-11-09 PROCEDURE — 73502 X-RAY EXAM HIP UNI 2-3 VIEWS: CPT | Mod: 26,LT,, | Performed by: RADIOLOGY

## 2023-11-09 NOTE — PROGRESS NOTES
Subjective:     Chief Complaint: Sheldon Sanon is a 33 y.o. male who had concerns including Pain of the Left Hip.    HPI    Patient presents to clinic with acute left hip pain x 8 years. Patient states the pain began gradually and is localized along the anterior aspect of the hip at the groin. He denies any LIOR or traumatic event.  Pain is made worse with any external rotation of the hip which can occur during ambulation in anything involving straddling.  He rates the pain as 2/10.  He has attempted multiple conservative measures that include activity modification, ice & elevation, and oral medications (ibuprofen), with little relief. He denies any mechanical symptoms to include locking and catching or instability.  Is affecting ADLs and limiting desired level of activity. Denies numbness, tingling, radiation, and inability to bear weight. He is here today to discuss treatment options.    No previous surgeries or trauma on bilateral hips    Review of Systems   Constitutional: Negative.   HENT: Negative.     Eyes: Negative.    Cardiovascular: Negative.    Respiratory: Negative.     Endocrine: Negative.    Hematologic/Lymphatic: Negative.    Skin: Negative.    Musculoskeletal:  Positive for joint pain. Negative for arthritis, falls, joint swelling, muscle weakness and stiffness.   Neurological: Negative.    Psychiatric/Behavioral: Negative.     Allergic/Immunologic: Negative.        Pain Related Questions  Over the past 3 days, what was your average pain during activity? (I.e. running, jogging, walking, climbing stairs, getting dressed, ect.): 4  Over the past 3 days, what was your highest pain level?: 5  Over the past 3 days, what was your lowest pain level? : 3    Other  How many nights a week are you awakened by your affected body part?: 0  Was the patient's HEIGHT measured or patient reported?: Patient Reported  Was the patient's WEIGHT measured or patient reported?: Measured    Objective:     General:  Sheldon is well-developed, well-nourished, appears stated age, in no acute distress, alert and oriented to time, place and person.     General    Nursing note and vitals reviewed.  Constitutional: He is oriented to person, place, and time. He appears well-developed and well-nourished. No distress.   HENT:   Head: Normocephalic and atraumatic.   Nose: Nose normal.   Eyes: EOM are normal.   Cardiovascular:  Intact distal pulses.            Pulmonary/Chest: Effort normal. No respiratory distress.   Neurological: He is alert and oriented to person, place, and time.   Psychiatric: He has a normal mood and affect. His behavior is normal. Judgment and thought content normal.           Right Knee Exam     Inspection   Alignment:  normal  Effusion: absent    Left Knee Exam     Inspection   Alignment:  normal  Effusion: absent    Right Hip Exam     Inspection   Scars: absent  Swelling: absent  Bruising: absent  No deformity of hip.  Quadriceps Atrophy:  Negative  Erythema: absent    Range of Motion   Abduction:  45   Adduction:  30   Extension:  20   Flexion:  120   External rotation:  80   Internal rotation:  30     Tests   Pain w/ forced internal rotation (GILMAR): absent  Pain w/ forced external rotation (FADIR): absent  Amrio: negative  Trendelenburg Test: negative  Circumduction test: negative  Stinchfield test: negative  Log Roll: negative    Other   Sensation: normal  Left Hip Exam     Inspection   Scars: absent  Swelling: absent  No deformity of hip.  Quadriceps Atrophy:  negative  Erythema: absent  Bruising: absent    Range of Motion   Abduction:  45 normal   Adduction:  30 normal   Extension:  20 normal   Flexion:  120 normal   External rotation:  70 normal   Internal rotation: 30 normal     Tests   Pain w/ forced internal rotation (GILMAR): present  Pain w/ forced external rotation (FADIR): absent  Mario: positive  Trendelenburg Test: negative  Circumduction test: negative  Stinchfield test: negative  Log Roll:  negative    Other   Sensation: normal          Muscle Strength   Right Lower Extremity   Hip Abduction: 5/5   Hip Adduction: 5/5   Hip Flexion: 5/5   Ankle Dorsiflexion:  5/5   Left Lower Extremity   Hip Abduction: 4/5   Hip Adduction: 5/5   Hip Flexion: 5/5   Ankle Dorsiflexion:  5/5     Vascular Exam     Right Pulses  Dorsalis Pedis:      2+  Posterior Tibial:      2+        Left Pulses  Dorsalis Pedis:      2+  Posterior Tibial:      2+        Capillary Refill  Right Hand: normal capillary refill  Left Hand: normal capillary refill        Edema  Right Upper Leg: absent  Left Upper Leg: absent    Radiographs bilateral hip     My interpretation:     Bilateral cam deformities seen    No severe DJD, fracture, or any other bony abnormalities      Assessment:     Encounter Diagnoses   Name Primary?    Left hip pain     Femoral acetabular impingement Yes        Plan:     1. I made the decision to order new imaging of the extremity or extremities evaluated. I independently reviewed and interpreted the radiographs and/or MRIs today. These images were shown to the patient where I then discussed my findings in detail.    2. We discussed at length different treatment options including conservative vs surgical management. These include anti-inflammatories, acetaminophen, rest, ice, heat, formal physical therapy including strengthening and stretching exercises, home exercise programs, dry needling, and finally surgical intervention.  We discussed the multiple causes of his hip pain to include femoral acetabular impingement causing likely femoral acetabular labral tear.  I recommended conserve measures at this time to include formal physical therapy, for which she agreed to.      3. Ambulatory referral for formal physical therapy at [facility] with focus on  hip, core, gluteal strengthening and stretching.    4. RTC to see Moiz Delcid PA-C in 8 weeks for follow-up.  Will reassess hip pain and determine if an MRI with  contrast is warranted at that time.      All of the patient's questions were answered. Patient was advised to call the clinic or contact me through the patient portal for any questions or concerns.       Medical Dictation software was used during the dictation of portions or the entirety of this medical record.  Phonetic or grammatic errors may exist due to the use of this software. For clarification, refer to the author of the document.       Patient questionnaires may have been collected.

## 2023-11-20 ENCOUNTER — CLINICAL SUPPORT (OUTPATIENT)
Dept: REHABILITATION | Facility: HOSPITAL | Age: 33
End: 2023-11-20
Payer: COMMERCIAL

## 2023-11-20 DIAGNOSIS — M25.552 LEFT HIP PAIN: ICD-10-CM

## 2023-11-20 DIAGNOSIS — M25.859 FEMORAL ACETABULAR IMPINGEMENT: ICD-10-CM

## 2023-11-20 PROCEDURE — 97112 NEUROMUSCULAR REEDUCATION: CPT

## 2023-11-20 PROCEDURE — 97161 PT EVAL LOW COMPLEX 20 MIN: CPT

## 2023-11-20 PROCEDURE — 97530 THERAPEUTIC ACTIVITIES: CPT

## 2023-11-20 NOTE — PLAN OF CARE
"[facility]   Physical Therapy Initial Evaluation      Name: Sheldon Sanon  [facility] Number: [id number]    Therapy Diagnosis:   Encounter Diagnoses   Name Primary?    Left hip pain     Femoral acetabular impingement         Physician: Devyn Delcid*    Physician Orders: PT Eval and Treat  Medical Diagnosis from Referral:   Left hip pain   Femoral acetabular impingement     Evaluation Date: 11/20/2023  Authorization Period Expiration: 11/8/24  Plan of Care Expiration: 1/31/24  Progress Note Due: 12/20/23  Date of Surgery: na  Visit # / Visits authorized: 1/ 1   FOTO: 1/ 3    Precautions: Standard     Time In: 1:40 pm  Time Out: 2:30 pm  Total Billable Time: 50 minutes    Subjective     Date of onset: 10 years intermittent pain    History of current condition - Sheldon reports: L anterior/lateral hip pain that has been present for several years. He has pain when he straddles objects, on runs, and any kind of movement when he uses his hip flexors. The only thing that helped him was yoga 2x/day which did help the pain go away while he was doing it. But has since stopped since he stopped yoga. He wants to learn exercises that may help the condition. He states that 10 years ago it may have been a hyper extension injury in soccer.     Falls: none    Imaging: FINDINGS:  Hip joint spaces appear adequately maintained, without significant narrowing on either side.  No conventional radiographic evidence of recent or healing fracture, lytic destructive process, or femoroacetabular impingement.  SI joints appear unremarkable.       Prior Therapy: unofficial PT  Social History:  lives with their family  Occupation: works on [employer]. Climbing up into attics frequently  Prior Level of Function: limited by hip pain for 10 years  Current Level of Function: same     Pain:  Current 0/10, worst 2/10, best 0/10   Location: left hip  Description: "tightness"  Aggravating Factors: see above  Easing " Factors: rest, yoga    Patients goals: reduce pain improve function     Medical History:   Past Medical History:   Diagnosis Date    Acne     ADD (attention deficit disorder)        Surgical History:   Sheldon Sanon  has a past surgical history that includes none.    Medications:   Sheldon has a current medication list which includes the following prescription(s): acyclovir, bupropion, chorionic gonadotropin, human, dextroamphetamine-amphetamine, testosterone cypionate, and venlafaxine.    Allergies:   Review of patient's allergies indicates:  No Known Allergies     Objective      Observation: slight anterior pelvic tilt    Hip Range of Motion:   Right Passive Left Passive   Flexion 130 90 *   Extension NT NT   Ext. Rotation 50 45   Int. Rotation 15 5*       Lower Extremity Strength  Right LE  Left LE    Quadriceps: 4+/5 Quadriceps: 4+/5   Hamstrings: 4+/5 Hamstrings: 4+/5   Iliopsoas: 30.23# Iliopsoas: 20.8# = 32% deficit    Hip extension:  3+/5 Hip extension: 3+/5   PGM: 39.7# PGM: 30.6# = 23% deficit   Hip ER: NT Hip ER: NT   Hip IR: NT Hip IR: NT     Functional Tests:  SL Squat Test: R: normal ; L significant valgus, hip drop, lateral trunk lean  SLS EO: diminished    Special Tests:   FABERs:  -   JOCELIN:+  Hip Scour: +  Slump: -    Flexibility:      Hamstrings: R = + ; L = +    Mario's test: R = - ; L = -  Juma Test Right  Left    Iliopsoas - -   Rectus Femoris  + +       Joint Mobility: hypomobile hip all directions on L     Palpation: min palpable tenderness    Edema: none     Intake Outcome Measure for FOTO hip Survey    Therapist reviewed FOTO scores for Sheldon Sanon on 11/20/2023.   FOTO report - see Media section or FOTO account episode details.    Intake Score: 27%         Treatment     Total Treatment time (time-based codes) separate from Evaluation: 18 minutes     Sheldon received the treatments listed below:      neuromuscular re-education activities to improve: Kinesthetic and  Proprioception for 10 minutes. The following activities were included:  Education on the following:  Clam shells  Prone quad stretch 4x30s   Hip 90/90 active stretch       therapeutic activities to improve functional performance for 08 minutes, including:  Patient education on activity modification for ADLs and gym, LIOR of JOCELIN, HEP, POC      Patient Education and Home Exercises     Education provided:   - HEP, POC, answered patient questions      Written Home Exercises Provided: yes. Exercises were reviewed and Sheldon was able to demonstrate them prior to the end of the session.  Sheldon demonstrated good  understanding of the education provided. See EMR under Patient Instructions for exercises provided during therapy sessions.    Assessment     Sheldon is a 33 y.o. male referred to outpatient Physical Therapy with a medical diagnosis of   M25.552 (ICD-10-CM) - Left hip pain   M25.859 (ICD-10-CM) - Femoral acetabular impingement   Patient presents with complaints of continued L hip pain  consistent with referring dx limiting ADL's and functional activities. S/S are consistent with JOCELIN per objective testing above. Upon evaluation patient presents with decreased ROM, joint mobility and flexibility restrictions, decreased strength and motor control contributing to limited functional status at this time. Patient would benefit from appropriate manual therapy, mobility, flexibility, strengthening and NM re-education in order to address the before-mentioned deficits and return to PLOF with ADLs and return to recreational running and exercise.       Patient prognosis is Good.   Patient will benefit from skilled outpatient Physical Therapy to address the deficits stated above and in the chart below, provide patient /family education, and to maximize patientt's level of independence.     Plan of care discussed with patient: Yes  Patient's spiritual, cultural and educational needs considered and patient is agreeable to the plan  of care and goals as stated below:     Anticipated Barriers for therapy: chronicity of pain    Medical Necessity is demonstrated by the following  History  Co-morbidities and personal factors that may impact the plan of care [x] LOW: no personal factors / co-morbidities  [] MODERATE: 1-2 personal factors / co-morbidities  [] HIGH: 3+ personal factors / co-morbidities    Moderate / High Support Documentation:   Co-morbidities affecting plan of care: see med hx    Personal Factors:   no deficits     Examination  Body Structures and Functions, activity limitations and participation restrictions that may impact the plan of care [x] LOW: addressing 1-2 elements  [] MODERATE: 3+ elements  [] HIGH: 4+ elements (please support below)    Moderate / High Support Documentation: none     Clinical Presentation [x] LOW: stable  [] MODERATE: Evolving  [] HIGH: Unstable     Decision Making/ Complexity Score: low       GOALS: Short Term Goals:  4 weeks  1.Report decreased hip pain  < / =  1/10 at worst  to increase tolerance for work.  2. Increase ROM by 5-20 degrees where limited in order to perform ADLs without difficulty.  3. Increase strength by 1/3 MMT grade in areas of limitation  to increase tolerance for ADL and work activities.  4. Pt to tolerate HEP to improve ROM and independence with ADL's    Long Term Goals: 8 weeks  1.Patient goal: able [place].   2.Increase strength to 4+/5 in  areas of limitation  to increase tolerance for ADL and work activities.  3. Pt will report at CJ level (20-40% impaired) on LEFS  to demonstrate increase in LE function with every day tasks.    Plan     Plan of care Certification: 11/20/2023 to 1/31/23.    Outpatient Physical Therapy 1-2 times weekly for 8 weeks to include the following interventions: Gait Training, Manual Therapy, Neuromuscular Re-ed, Therapeutic Activities, and Therapeutic Exercise.     William Bashir PT        Physician's Signature:  _________________________________________ Date: ________________

## 2023-11-30 NOTE — PROGRESS NOTES
[facility]   Physical Therapy Treatment Note      [facility]  [facility] Number: [id number]    Therapy Diagnosis:   Encounter Diagnosis   Name Primary?    Left hip pain Yes     Physician: Devyn Delcid*    Visit Date: 12/1/2023    Physician Orders: PT Eval and Treat  Medical Diagnosis from Referral:   Left hip pain   Femoral acetabular impingement      Evaluation Date: 11/20/2023  Authorization Period Expiration: 11/8/24  Plan of Care Expiration: 1/31/24  Progress Note Due: 12/20/23  Date of Surgery: na  Visit # / Visits authorized: 1/ 1   FOTO: 1/ 3     Precautions: Standard      Time In: 8:00 pm  Time Out: 9:05 pm  Total Billable Time: 65 minutes       PTA Visit #: 0/5       Subjective     Patient reports: hip is feeling good today.  He was compliant with home exercise program.  Response to previous treatment: melissa eval well  Functional change: ongoing    Pain: 5/10 pre manual, ~1/10 post manual   Location: left hip      Objective      Objective Measures updated at progress report unless specified.     Treatment     Sheldon received the treatments listed below:      therapeutic exercises to develop strength, endurance, ROM, and flexibility for 15 minutes including:  Hip banded mobility: rock backs, pigeon, 1/2 kneeling IR   Hip flexor eccentric 5# x15    manual therapy techniques: Joint mobilizations and Soft tissue Mobilization were applied to the: hip for 23 minutes, including:  LAD Grade 4-5   Hip inf glide Grade 4-5   Hip ant glide Grade 3-4   Hip posterior glide grade 3-4     neuromuscular re-education activities to improve: Kinesthetic and Proprioception for 12 minutes. The following activities were included:  Hip ER straight leg YTB 3x15  SL bridge modified - 3x10        therapeutic activities to improve functional performance for 10 minutes, including:  Bike 10 mins for muscular endurance    Patient Education and Home Exercises       Education provided:   -  banded mobility    Written Home Exercises Provided: yes. Exercises were reviewed and Sheldon was able to demonstrate them prior to the end of the session.  Sheldon demonstrated good  understanding of the education provided. See Electronic Medical Record under Patient Instructions for exercises provided during therapy sessions    Assessment     Focused on mobility and isolated hip strength today. Pre and post squat test was much improved after hip mobs. Overall doing well.     Sheldon Is progressing well towards his goals.   Patient prognosis is Excellent.     Patient will continue to benefit from skilled outpatient physical therapy to address the deficits listed in the problem list box on initial evaluation, provide pt/family education and to maximize pt's level of independence in the home and community environment.     Patient's spiritual, cultural and educational needs considered and pt agreeable to plan of care and goals.     Anticipated barriers to physical therapy: chronicity of pain    GOALS: Short Term Goals:  4 weeks  1.Report decreased hip pain  < / =  1/10 at worst  to increase tolerance for work.  2. Increase ROM by 5-20 degrees where limited in order to perform ADLs without difficulty.  3. Increase strength by 1/3 MMT grade in areas of limitation  to increase tolerance for ADL and work activities.  4. Pt to tolerate HEP to improve ROM and independence with ADL's     Long Term Goals: 8 weeks  1.Patient goal: able [place].   2.Increase strength to 4+/5 in  areas of limitation  to increase tolerance for ADL and work activities.  3. Pt will report at CJ level (20-40% impaired) on LEFS  to demonstrate increase in LE function with every day tasks.      Plan     Cont POC    William Bashir, PT

## 2023-12-01 ENCOUNTER — CLINICAL SUPPORT (OUTPATIENT)
Dept: REHABILITATION | Facility: HOSPITAL | Age: 33
End: 2023-12-01
Payer: COMMERCIAL

## 2023-12-01 DIAGNOSIS — M25.552 LEFT HIP PAIN: Primary | ICD-10-CM

## 2023-12-01 PROCEDURE — 97110 THERAPEUTIC EXERCISES: CPT

## 2023-12-01 PROCEDURE — 97112 NEUROMUSCULAR REEDUCATION: CPT

## 2023-12-01 PROCEDURE — 97530 THERAPEUTIC ACTIVITIES: CPT

## 2023-12-01 PROCEDURE — 97140 MANUAL THERAPY 1/> REGIONS: CPT

## 2023-12-15 ENCOUNTER — CLINICAL SUPPORT (OUTPATIENT)
Dept: REHABILITATION | Facility: HOSPITAL | Age: 33
End: 2023-12-15
Payer: COMMERCIAL

## 2023-12-15 ENCOUNTER — PATIENT MESSAGE (OUTPATIENT)
Dept: REHABILITATION | Facility: HOSPITAL | Age: 33
End: 2023-12-15

## 2023-12-15 DIAGNOSIS — M25.552 LEFT HIP PAIN: Primary | ICD-10-CM

## 2023-12-15 PROCEDURE — 97140 MANUAL THERAPY 1/> REGIONS: CPT

## 2023-12-15 PROCEDURE — 97110 THERAPEUTIC EXERCISES: CPT

## 2023-12-15 PROCEDURE — 97112 NEUROMUSCULAR REEDUCATION: CPT

## 2023-12-15 PROCEDURE — 97530 THERAPEUTIC ACTIVITIES: CPT

## 2023-12-15 NOTE — PROGRESS NOTES
[facility]   Physical Therapy Treatment Note      [facility]  [facility] Number: [id number]    Therapy Diagnosis:   Encounter Diagnosis   Name Primary?    Left hip pain Yes     Physician: Devyn Delcid*    Visit Date: 12/15/2023    Physician Orders: PT Eval and Treat  Medical Diagnosis from Referral:   Left hip pain   Femoral acetabular impingement      Evaluation Date: 11/20/2023  Authorization Period Expiration: 11/8/24  Plan of Care Expiration: 1/31/24  Progress Note Due: 12/20/23  Date of Surgery: na  Visit # / Visits authorized: 1/ 1   FOTO: 1/ 3     Precautions: Standard      Time In: 8:00 pm  Time Out: 9:01 pm  Total Billable Time: 61 minutes       PTA Visit #: 0/5       Subjective     Patient reports: hip is feeling good today.  He was compliant with home exercise program.  Response to previous treatment: melissa eval well  Functional change: ongoing    Pain: 5/10 pre manual, ~1/10 post manual   Location: left hip      Objective      Objective Measures updated at progress report unless specified.     Treatment     Sheldon received the treatments listed below:      therapeutic exercises to develop strength, endurance, ROM, and flexibility for 08 minutes including:  Hp 90/90 active x5 each     manual therapy techniques: Joint mobilizations and Soft tissue Mobilization were applied to the: hip for 23 minutes, including:  LAD Grade 4-5   Hip inf glide Grade 4-5   Hip ant glide Grade 3-4   Hip posterior glide grade 3-4     neuromuscular re-education activities to improve: Kinesthetic and Proprioception for 15 minutes. The following activities were included:  Hip ER at wall 3x15  SL hip thrusters - 3x10  Supine psoas march YTB - 3x5   S/L hip abd with modified side plank - 3x10        therapeutic activities to improve functional performance for 15 minutes, including:  Bike 10 mins for muscular endurance  Deadlifts KB 30# 3x10     Patient Education and Home Exercises        Education provided:   - banded mobility    Written Home Exercises Provided: yes. Exercises were reviewed and Sheldon was able to demonstrate them prior to the end of the session.  Sheldon demonstrated good  understanding of the education provided. See Electronic Medical Record under Patient Instructions for exercises provided during therapy sessions    Assessment     Sheldon is doing well and his hip mobility is improving. Added functional exercises of hip thrusters and deadlifts today which were tolerated well. Also added anterior hip strengthening to the program. Responding well at this time.      Sheldon Is progressing well towards his goals.   Patient prognosis is Excellent.     Patient will continue to benefit from skilled outpatient physical therapy to address the deficits listed in the problem list box on initial evaluation, provide pt/family education and to maximize pt's level of independence in the home and community environment.     Patient's spiritual, cultural and educational needs considered and pt agreeable to plan of care and goals.     Anticipated barriers to physical therapy: chronicity of pain    GOALS: Short Term Goals:  4 weeks  1.Report decreased hip pain  < / =  1/10 at worst  to increase tolerance for work.  2. Increase ROM by 5-20 degrees where limited in order to perform ADLs without difficulty.  3. Increase strength by 1/3 MMT grade in areas of limitation  to increase tolerance for ADL and work activities.  4. Pt to tolerate HEP to improve ROM and independence with ADL's     Long Term Goals: 8 weeks  1.Patient goal: able to [place].   2.Increase strength to 4+/5 in  areas of limitation  to increase tolerance for ADL and work activities.  3. Pt will report at CJ level (20-40% impaired) on LEFS  to demonstrate increase in LE function with every day tasks.      Plan     Cont POC    William Bashir, PT

## 2023-12-22 ENCOUNTER — PATIENT MESSAGE (OUTPATIENT)
Dept: REHABILITATION | Facility: HOSPITAL | Age: 33
End: 2023-12-22

## 2023-12-22 ENCOUNTER — CLINICAL SUPPORT (OUTPATIENT)
Dept: REHABILITATION | Facility: HOSPITAL | Age: 33
End: 2023-12-22
Payer: COMMERCIAL

## 2023-12-22 DIAGNOSIS — M25.552 LEFT HIP PAIN: Primary | ICD-10-CM

## 2023-12-22 PROCEDURE — 97530 THERAPEUTIC ACTIVITIES: CPT

## 2023-12-22 PROCEDURE — 97110 THERAPEUTIC EXERCISES: CPT

## 2023-12-22 PROCEDURE — 97112 NEUROMUSCULAR REEDUCATION: CPT

## 2023-12-22 PROCEDURE — 97140 MANUAL THERAPY 1/> REGIONS: CPT

## 2023-12-22 NOTE — PROGRESS NOTES
[facility]   Physical Therapy Treatment Note      [facility]  [facility] Number: [id number]    Therapy Diagnosis:   Encounter Diagnosis   Name Primary?    Left hip pain Yes     Physician: Devyn Delcid*    Visit Date: 12/22/2023    Physician Orders: PT Eval and Treat  Medical Diagnosis from Referral:   Left hip pain   Femoral acetabular impingement      Evaluation Date: 11/20/2023  Authorization Period Expiration: 11/8/24  Plan of Care Expiration: 1/31/24  Progress Note Due: 12/20/23  Date of Surgery: na  Visit # / Visits authorized: 1/ 1   FOTO: 1/ 3     Precautions: Standard      Time In: 9:00 am  Time Out: 10:02 am  Total Billable Time: 62 minutes       PTA Visit #: 0/5       Subjective     Patient reports: he was able to do deadlifts at the gym with no pain.   He was compliant with home exercise program.  Response to previous treatment: melissa eval well  Functional change: ongoing    Pain: 5/10 pre manual, ~1/10 post manual   Location: left hip      Objective      L hip IR passive: 20 degrees     Treatment     Sheldon received the treatments listed below:      therapeutic exercises to develop strength, endurance, ROM, and flexibility for 08 minutes including:  Hip 90/90 active x5 each       manual therapy techniques: Joint mobilizations and Soft tissue Mobilization were applied to the: hip for 23 minutes, including:  LAD Grade 4-5   Hip inf glide Grade 4-5   Hip ant glide Grade 3-4   Hip posterior glide grade 3-4     neuromuscular re-education activities to improve: Kinesthetic and Proprioception for 08 minutes. The following activities were included:  Hip ER in CKC BTB - 3x10         therapeutic activities to improve functional performance for 23 minutes, including:  Bike 10 mins for muscular endurance  Deadlifts BB 95# x10, 190# 2x5    Patient Education and Home Exercises       Education provided:   - banded mobility    Written Home Exercises Provided: yes.  Exercises were reviewed and Sheldon was able to demonstrate them prior to the end of the session.  Sheldon demonstrated good  understanding of the education provided. See Electronic Medical Record under Patient Instructions for exercises provided during therapy sessions    Assessment     Sheldon is doing well and his hip mobility is improving. IR measured at 20 degrees today which is an improvement since IE. Worked on deadlift form today and gave patient advice on a slow progression of this exercise. Also worked on deep hip intrinsic stability which was melissa well. Overall the patient is progressing well per POC.     Sheldon Is progressing well towards his goals.   Patient prognosis is Excellent.     Patient will continue to benefit from skilled outpatient physical therapy to address the deficits listed in the problem list box on initial evaluation, provide pt/family education and to maximize pt's level of independence in the home and community environment.     Patient's spiritual, cultural and educational needs considered and pt agreeable to plan of care and goals.     Anticipated barriers to physical therapy: chronicity of pain    GOALS: Short Term Goals:  4 weeks  1.Report decreased hip pain  < / =  1/10 at worst  to increase tolerance for work.  2. Increase ROM by 5-20 degrees where limited in order to perform ADLs without difficulty.  3. Increase strength by 1/3 MMT grade in areas of limitation  to increase tolerance for ADL and work activities.  4. Pt to tolerate HEP to improve ROM and independence with ADL's     Long Term Goals: 8 weeks  1.Patient goal: able to [place].   2.Increase strength to 4+/5 in  areas of limitation  to increase tolerance for ADL and work activities.  3. Pt will report at CJ level (20-40% impaired) on LEFS  to demonstrate increase in LE function with every day tasks.      Plan     Cont POC    William Bashir, PT

## 2023-12-29 ENCOUNTER — CLINICAL SUPPORT (OUTPATIENT)
Dept: REHABILITATION | Facility: HOSPITAL | Age: 33
End: 2023-12-29
Payer: COMMERCIAL

## 2023-12-29 DIAGNOSIS — M25.552 LEFT HIP PAIN: Primary | ICD-10-CM

## 2023-12-29 PROCEDURE — 97110 THERAPEUTIC EXERCISES: CPT

## 2023-12-29 PROCEDURE — 97530 THERAPEUTIC ACTIVITIES: CPT

## 2023-12-29 PROCEDURE — 97112 NEUROMUSCULAR REEDUCATION: CPT

## 2023-12-29 PROCEDURE — 97140 MANUAL THERAPY 1/> REGIONS: CPT

## 2023-12-29 NOTE — PROGRESS NOTES
[facility]   Physical Therapy Treatment Note      Name: Sheldon Dennison [facility] Number: [id number]    Therapy Diagnosis:   Encounter Diagnosis   Name Primary?    Left hip pain Yes     Physician: Devyn Delcid*    Visit Date: 12/29/2023    Physician Orders: PT Eval and Treat  Medical Diagnosis from Referral:   Left hip pain   Femoral acetabular impingement      Evaluation Date: 11/20/2023  Authorization Period Expiration: 11/8/24  Plan of Care Expiration: 1/31/24  Progress Note Due: 12/20/23  Date of Surgery: na  Visit # / Visits authorized: 1/ 1   FOTO: 1/ 3     Precautions: Standard      Time In: 9:00 am  Time Out: 10:02 am  Total Billable Time: 62 minutes       PTA Visit #: 0/5       Subjective     Patient reports: Continues to note improvements in ROM    He was compliant with home exercise program.  Response to previous treatment: melissa eval well  Functional change: ongoing    Pain: not verbalized today  Location: left hip      Objective      L hip IR passive: 20 degrees     Treatment     Sheldon received the treatments listed below:      therapeutic exercises to develop strength, endurance, ROM, and flexibility for 08 minutes including:  Hip 90/90 active x5 each       manual therapy techniques: Joint mobilizations and Soft tissue Mobilization were applied to the: hip for 15 minutes, including:  LAD Grade 4-5   Hip inf glide Grade 4-5   Hip posterior glide grade 3-4     neuromuscular re-education activities to improve: Kinesthetic and Proprioception for 25 minutes. The following activities were included:  Hip ER SL +2#- 3x10   Airplanes - 3x10   S/L hip abd against wall - 3x10         therapeutic activities to improve functional performance for 12 minutes, including:  Bike 12 mins for muscular endurance      Patient Education and Home Exercises       Education provided:   - banded mobility    Written Home Exercises Provided: yes. Exercises were reviewed and Sheldon was able  to demonstrate them prior to the end of the session.  Sheldon demonstrated good  understanding of the education provided. See Electronic Medical Record under Patient Instructions for exercises provided during therapy sessions    Assessment     Sheldon is doing well and his hip mobility is improving. Focused on deep hip intrinsic strength today which was fatiguing for the patient but melissa well. Overall progressing. May need 2-3 more weeks depending on how he responds.     Sheldon Is progressing well towards his goals.   Patient prognosis is Excellent.     Patient will continue to benefit from skilled outpatient physical therapy to address the deficits listed in the problem list box on initial evaluation, provide pt/family education and to maximize pt's level of independence in the home and community environment.     Patient's spiritual, cultural and educational needs considered and pt agreeable to plan of care and goals.     Anticipated barriers to physical therapy: chronicity of pain    GOALS: Short Term Goals:  4 weeks  1.Report decreased hip pain  < / =  1/10 at worst  to increase tolerance for work.  2. Increase ROM by 5-20 degrees where limited in order to perform ADLs without difficulty.  3. Increase strength by 1/3 MMT grade in areas of limitation  to increase tolerance for ADL and work activities.  4. Pt to tolerate HEP to improve ROM and independence with ADL's     Long Term Goals: 8 weeks  1.Patient goal: able [place]   2.Increase strength to 4+/5 in  areas of limitation  to increase tolerance for ADL and work activities.  3. Pt will report at CJ level (20-40% impaired) on LEFS  to demonstrate increase in LE function with every day tasks.      Plan     Cont POC    William Bashir, PT

## 2024-01-05 ENCOUNTER — PATIENT MESSAGE (OUTPATIENT)
Dept: REHABILITATION | Facility: HOSPITAL | Age: 34
End: 2024-01-05

## 2024-01-12 ENCOUNTER — PATIENT MESSAGE (OUTPATIENT)
Dept: REHABILITATION | Facility: HOSPITAL | Age: 34
End: 2024-01-12

## 2024-01-12 ENCOUNTER — CLINICAL SUPPORT (OUTPATIENT)
Dept: REHABILITATION | Facility: HOSPITAL | Age: 34
End: 2024-01-12
Attending: ORTHOPAEDIC SURGERY
Payer: COMMERCIAL

## 2024-01-12 DIAGNOSIS — M25.552 LEFT HIP PAIN: Primary | ICD-10-CM

## 2024-01-12 PROCEDURE — 97112 NEUROMUSCULAR REEDUCATION: CPT

## 2024-01-12 PROCEDURE — 97530 THERAPEUTIC ACTIVITIES: CPT

## 2024-01-12 PROCEDURE — 97110 THERAPEUTIC EXERCISES: CPT

## 2024-01-12 NOTE — PROGRESS NOTES
[facility]   Physical Therapy Treatment Note      [facility]  [facility] Number: [id number]    Therapy Diagnosis:   Encounter Diagnosis   Name Primary?    Left hip pain Yes     Physician: Danilo Bolden MD    Visit Date: 1/12/2024    Physician Orders: PT Eval and Treat  Medical Diagnosis from Referral:   Left hip pain   Femoral acetabular impingement      Evaluation Date: 11/20/2023  Authorization Period Expiration: 11/8/24  Plan of Care Expiration: 1/31/24  Progress Note Due: 12/20/23  Date of Surgery: na  Visit # / Visits authorized: 1/ 20  FOTO: 1/ 3     Precautions: Standard      Time In: 8:30 pm  Time Out: 9:15 am  Total Billable Time: 45 minutes       PTA Visit #: 0/5       Subjective     Patient reports: he missed last week because he was sick, but overall doing well with noted improvements  He was compliant with home exercise program.  Response to previous treatment: melissa eval well  Functional change: ongoing    Pain: not verbalized today  Location: left hip      Objective      L hip IR passive: 20 degrees     Treatment     Sheldon received the treatments listed below:      therapeutic exercises to develop strength, endurance, ROM, and flexibility for 08 minutes including:  Hip 90/90 active 3x5 each       manual therapy techniques: Joint mobilizations and Soft tissue Mobilization were applied to the: hip for 2 minutes, including:  LAD Grade 4-5      neuromuscular re-education activities to improve: Kinesthetic and Proprioception for 25 minutes. The following activities were included:  Hip ER Wall +2#- 3x10   Airplanes - 3x10   S/L hip abd against wall - 3x10   Reverse clams YTB - 3x10        therapeutic activities to improve functional performance for 10 minutes, including:  Bike 10 mins for muscular endurance      Patient Education and Home Exercises       Education provided:   - banded mobility    Written Home Exercises Provided: yes. Exercises were reviewed and Edward  was able to demonstrate them prior to the end of the session.  Sheldon demonstrated good  understanding of the education provided. See Electronic Medical Record under Patient Instructions for exercises provided during therapy sessions    Assessment     Arrived at appointment time that was not scheduled, so had to work him in which therefore reduced our time. However we were able to work on passive and active hip mobility and deep hip intrinsic strength. Will schedule 2 more visits and likely D/C after this    Edantony Is progressing well towards his goals.   Patient prognosis is Excellent.     Patient will continue to benefit from skilled outpatient physical therapy to address the deficits listed in the problem list box on initial evaluation, provide pt/family education and to maximize pt's level of independence in the home and community environment.     Patient's spiritual, cultural and educational needs considered and pt agreeable to plan of care and goals.     Anticipated barriers to physical therapy: chronicity of pain    GOALS: Short Term Goals:  4 weeks  1.Report decreased hip pain  < / =  1/10 at worst  to increase tolerance for work.  2. Increase ROM by 5-20 degrees where limited in order to perform ADLs without difficulty.  3. Increase strength by 1/3 MMT grade in areas of limitation  to increase tolerance for ADL and work activities.  4. Pt to tolerate HEP to improve ROM and independence with ADL's     Long Term Goals: 8 weeks  1.Patient goal: able [place].   2.Increase strength to 4+/5 in  areas of limitation  to increase tolerance for ADL and work activities.  3. Pt will report at CJ level (20-40% impaired) on LEFS  to demonstrate increase in LE function with every day tasks.      Plan     Cont POC    William Bashir, PT

## 2024-01-18 ENCOUNTER — PATIENT MESSAGE (OUTPATIENT)
Dept: DERMATOLOGY | Facility: CLINIC | Age: 34
End: 2024-01-18

## 2024-01-18 ENCOUNTER — OFFICE VISIT (OUTPATIENT)
Dept: DERMATOLOGY | Facility: CLINIC | Age: 34
End: 2024-01-18
Payer: COMMERCIAL

## 2024-01-18 DIAGNOSIS — L70.0 ACNE VULGARIS: Primary | ICD-10-CM

## 2024-01-18 PROCEDURE — 1160F RVW MEDS BY RX/DR IN RCRD: CPT | Mod: CPTII,95,, | Performed by: DERMATOLOGY

## 2024-01-18 PROCEDURE — 1159F MED LIST DOCD IN RCRD: CPT | Mod: CPTII,95,, | Performed by: DERMATOLOGY

## 2024-01-18 PROCEDURE — 99204 OFFICE O/P NEW MOD 45 MIN: CPT | Mod: 95,,, | Performed by: DERMATOLOGY

## 2024-01-18 RX ORDER — DOXYCYCLINE 100 MG/1
TABLET ORAL
Qty: 30 TABLET | Refills: 1 | Status: SHIPPED | OUTPATIENT
Start: 2024-01-18 | End: 2024-04-03 | Stop reason: SDUPTHER

## 2024-01-18 RX ORDER — CLINDAMYCIN PHOSPHATE 10 UG/ML
LOTION TOPICAL
Qty: 60 ML | Refills: 3 | Status: SHIPPED | OUTPATIENT
Start: 2024-01-18

## 2024-01-18 NOTE — PATIENT INSTRUCTIONS
Acne Treatment    Retinoids (e.g. adapalene, tretinoin, tazarotene) are vitamin A derivatives that are the mainstay of acne therapy. The skin often becomes dry, red, or irritated when first using them--this is a normal period of adjustment.   Use only a pea-sized amount for the entire face to avoid excess irritation.   If your skin is sensitive, begin by using the medication two nights per week or every other night for the first couple of months until your skin adjusts.   Use as much oil-free moisturizer as needed to help your skin adjust to the retinoid.  There is no miracle, overnight cure for acne. It may take 6-8 weeks to start seeing some improvement, and you should continue to improve over the following months. It is important that you keep your follow up appointments so that any medication changes can be made if necessary.  Your acne may get worse before it gets better. This is normal! Just hang in there, incorporate the meds into your daily routine, and trust that the medication will work.   Do not scrub your face. Aggressive scrubbing can make acne worse.  Do not pick or squeeze your pimples, as this will cause scarring. Acne is temporary, but scars are permanent.  Antibiotics: Antibiotics are sometimes prescribed to decrease acne by reducing inflammation. They are not a good long-term solution; they have side effects as all meds do; and they should always be used along with the topical treatments that are recommended.  Waxing: Stop using the retinoid 1 week prior to any waxing, as skin is more likely to tear.  Diet: Avoid eating foods with a high glycemic load/index (high sugar, simple carbs) which can worsen acne. Also, avoid drinking a lot of skim or low fat milk, and avoid the whey protein found in most protein shakes and bars, as these can also worsen acne.  Makeup: Use only oil-free, non-comedogenic makeup. Brands to consider include Neutrogena, Tarte, Bare Minerals, Meena Tavera, Alissa Isbell,  Clinique. (Avoid MAC.)  For female patients: Discontinue all oral and topical acne medications if you become pregnant or are planning to become pregnant. Notify our office, and we will direct you to medications that are safe to use during pregnancy.

## 2024-01-18 NOTE — PROGRESS NOTES
The patient location is: home  The chief complaint leading to consultation is: acne  Visit type: virtual visit with synchronous audio and video  Total time spent with patient: 15 minutes  Each patient to whom I provide medical services by telemedicine is:  (1) informed of the relationship between the physician and patient and the respective role of any other health care provider with respect to management of the patient; and (2) notified that he or she may decline to receive medical services by telemedicine and may withdraw from such care at any time.      Patient Information  Name: Sheldon Sanon  : 1990  MRN: [id number]     Referring Physician:  No ref. provider found   Primary Care Physician:  Danilo Bolden MD   Date of Visit: 2024      Subjective:     History of Present lllness:    Sheldon Sanon is a 33 y.o. male who presents with a chief complaint of acne.    Location: face, shoulders, arms, chest and torso  Duration: off and on since puberty, flared x few months  Signs/Symptoms: irritated, redness, scabs, swelling, tender  Exacerbating factors: heat picking, stress, picking, sweating, testosterone  Relieving factors/Prior treatments: currently using OTC acne medication--Cetaphil cleanser and moisturizer, The Ordinary glycolic and sal acid  (He had depression while on accutane in 10th grade)    Clinical documentation obtained by nursing staff reviewed.    Review of Systems    Objective:   Physical Exam     Diagram Legend     Erythematous scaling macule/papule c/w actinic keratosis       Vascular papule c/w angioma      Pigmented verrucoid papule/plaque c/w seborrheic keratosis      Yellow umbilicated papule c/w sebaceous hyperplasia      Irregularly shaped tan macule c/w lentigo     1-2 mm smooth white papules consistent with Milia      Movable subcutaneous cyst with punctum c/w epidermal inclusion cyst      Subcutaneous movable cyst c/w pilar cyst      Firm pink to brown papule c/w  dermatofibroma      Pedunculated fleshy papule(s) c/w skin tag(s)      Evenly pigmented macule c/w junctional nevus     Mildly variegated pigmented, slightly irregular-bordered macule c/w mildly atypical nevus      Flesh colored to evenly pigmented papule c/w intradermal nevus       Pink pearly papule/plaque c/w basal cell carcinoma      Erythematous hyperkeratotic cursted plaque c/w SCC      Surgical scar with no sign of skin cancer recurrence      Open and closed comedones      Inflammatory papules and pustules      Verrucoid papule consistent consistent with wart     Erythematous eczematous patches and plaques     Dystrophic onycholytic nail with subungual debris c/w onychomycosis     Umbilicated papule    Erythematous-base heme-crusted tan verrucoid plaque consistent with inflamed seborrheic keratosis     Erythematous Silvery Scaling Plaque c/w Psoriasis     See annotation              [] Data reviewed  [] Prior external notes reviewed  [] Independent review of test  [] Management discussed with another provider  [] Independent historian    Assessment / Plan:        Acne vulgaris  - chronic problem with exacerbation/progression    -     doxycycline monohydrate 100 mg Tab; Take 1 po qday pc. Take with plenty of water.  Dispense: 30 tablet; Refill: 1  Side effects of doxycyline include but are not limited to GI discomfort, esophageal irritation/ulceration, and increased sun sensitivity. Take medicine with meals (but no dairy), plenty of water, and at least 1 hour before lying down.    -     clindamycin (CLEOCIN T) 1 % lotion; Apply to affected areas of back BID prn acne.  Dispense: 60 mL; Refill: 3    -     tazarotene (TAZORAC) 0.05 % Crea cream; Apply a pea-sized amount to entire back qhs.  Dispense: 60 g; Refill: 3  Apply moisturizer prior to application if drying or irritating.    Recommended an OTC benzoyl peroxide (2-5%) wash, such as CeraVe Acne Foaming Cream Cleanser, PanOxyl 4% Creamy Wash, or Neutrogena  Clear Pore Cleanser/Mask. It may be best to use benzoyl peroxide while in the shower and to dry off with a white towel, as it can bleach towels, sheets, and clothing if not rinsed well from the skin. Use benzoyl peroxide wash at least 2-3 times per week to prevent bacterial resistance.      Follow up in about 3 months (around 4/18/2024).      Antionette Zepeda MD, FAAD  [facility]

## 2024-01-21 DIAGNOSIS — F90.9 ATTENTION DEFICIT HYPERACTIVITY DISORDER (ADHD), UNSPECIFIED ADHD TYPE: ICD-10-CM

## 2024-01-21 NOTE — TELEPHONE ENCOUNTER
Care Due:                  Date            Visit Type   Department     Provider  --------------------------------------------------------------------------------                                NP -                              PRIMARY      [facility]  Last Visit: 11-      CARE ([facility])   MEDICINE       Danilo Bolden  Next Visit: None Scheduled  None         None Found                                                            Last  Test          Frequency    Reason                     Performed    Due Date  --------------------------------------------------------------------------------    CBC.........  12 months..  acyclovir................  Not Found    Overdue    Cr..........  12 months..  acyclovir, venlafaxine...  Not Found    Overdue    [employer] Embedded Care Due Messages. Reference number: [id number].   1/21/2024 12:07:17 PM CST

## 2024-01-22 RX ORDER — DEXTROAMPHETAMINE SACCHARATE, AMPHETAMINE ASPARTATE MONOHYDRATE, DEXTROAMPHETAMINE SULFATE AND AMPHETAMINE SULFATE 2.5; 2.5; 2.5; 2.5 MG/1; MG/1; MG/1; MG/1
10 CAPSULE, EXTENDED RELEASE ORAL DAILY
Qty: 90 CAPSULE | Refills: 0 | Status: SHIPPED | OUTPATIENT
Start: 2024-01-22 | End: 2024-04-22 | Stop reason: SDUPTHER

## 2024-01-26 ENCOUNTER — CLINICAL SUPPORT (OUTPATIENT)
Dept: REHABILITATION | Facility: HOSPITAL | Age: 34
End: 2024-01-26
Payer: COMMERCIAL

## 2024-01-26 DIAGNOSIS — M25.552 LEFT HIP PAIN: Primary | ICD-10-CM

## 2024-01-26 PROCEDURE — 97110 THERAPEUTIC EXERCISES: CPT

## 2024-01-26 NOTE — PROGRESS NOTES
[facility]   Physical Therapy Treatment Note / D/C     Name: Sheldon Sanon  [facility] Number: [id number]    Therapy Diagnosis:   Encounter Diagnosis   Name Primary?    Left hip pain Yes       Physician: Devyn Delcid*    Visit Date: 1/26/2024    Physician Orders: PT Eval and Treat  Medical Diagnosis from Referral:   Left hip pain   Femoral acetabular impingement      Evaluation Date: 11/20/2023  Authorization Period Expiration: 11/8/24  Plan of Care Expiration: 1/31/24  Progress Note Due: 12/20/23  Date of Surgery: na  Visit # / Visits authorized: 2/ 20  FOTO: 1/ 3     Precautions: Standard      Time In: 9:30 am  Time Out: 10:00 am  Total Billable Time: 30 minutes       PTA Visit #: 0/5       Subjective     Patient reports: he missed last week because he was sick, but overall doing well with noted improvements  He was compliant with home exercise program.  Response to previous treatment: melissa eval well  Functional change: ongoing    Pain: not verbalized today  Location: left hip      Objective      L hip IR passive: 25 degrees \  L hip ER and abd 5-/5   SL squat normal   DL squat normal   FOTO 100%    Treatment     Sheldon received the treatments listed below:      therapeutic exercises to develop strength, endurance, ROM, and flexibility for 30 minutes including:  Reassessment as above    Patient Education and Home Exercises       Education provided:   - banded mobility    Written Home Exercises Provided: yes. Exercises were reviewed and Sheldon was able to demonstrate them prior to the end of the session.  Sheldon demonstrated good  understanding of the education provided. See Electronic Medical Record under Patient Instructions for exercises provided during therapy sessions    Assessment     Pt has met all goals and pain is stabilized. D/C with HEP.     Sheldon Is progressing well towards his goals.   Patient prognosis is Excellent.     Patient will continue to benefit from  skilled outpatient physical therapy to address the deficits listed in the problem list box on initial evaluation, provide pt/family education and to maximize pt's level of independence in the home and community environment.     Patient's spiritual, cultural and educational needs considered and pt agreeable to plan of care and goals.     Anticipated barriers to physical therapy: chronicity of pain    GOALS: Short Term Goals:  4 weeks MET all  1.Report decreased hip pain  < / =  1/10 at worst  to increase tolerance for work.  2. Increase ROM by 5-20 degrees where limited in order to perform ADLs without difficulty.  3. Increase strength by 1/3 MMT grade in areas of limitation  to increase tolerance for ADL and work activities.  4. Pt to tolerate HEP to improve ROM and independence with ADL's     Long Term Goals: 8 weeks MET all  1.Patient goal: [place]   2.Increase strength to 4+/5 in  areas of limitation  to increase tolerance for ADL and work activities.  3. Pt will report at CJ level (20-40% impaired) on LEFS  to demonstrate increase in LE function with every day tasks.      Plan     D/C    William Bashir, PT

## 2024-02-05 ENCOUNTER — PATIENT MESSAGE (OUTPATIENT)
Dept: PSYCHIATRY | Facility: CLINIC | Age: 34
End: 2024-02-05
Payer: COMMERCIAL

## 2024-03-18 ENCOUNTER — OFFICE VISIT (OUTPATIENT)
Dept: PSYCHIATRY | Facility: CLINIC | Age: 34
End: 2024-03-18
Payer: COMMERCIAL

## 2024-03-18 VITALS
HEIGHT: 70 IN | HEART RATE: 101 BPM | BODY MASS INDEX: 30.19 KG/M2 | WEIGHT: 210.88 LBS | SYSTOLIC BLOOD PRESSURE: 123 MMHG | DIASTOLIC BLOOD PRESSURE: 84 MMHG

## 2024-03-18 DIAGNOSIS — F41.9 ANXIETY: ICD-10-CM

## 2024-03-18 DIAGNOSIS — F32.A DEPRESSION, UNSPECIFIED DEPRESSION TYPE: ICD-10-CM

## 2024-03-18 DIAGNOSIS — F90.0 ADHD (ATTENTION DEFICIT HYPERACTIVITY DISORDER), INATTENTIVE TYPE: Primary | ICD-10-CM

## 2024-03-18 PROCEDURE — 99999 PR PBB SHADOW E&M-EST. PATIENT-LVL III: CPT | Mod: PBBFAC,,, | Performed by: STUDENT IN AN ORGANIZED HEALTH CARE EDUCATION/TRAINING PROGRAM

## 2024-03-18 PROCEDURE — 3074F SYST BP LT 130 MM HG: CPT | Mod: CPTII,S$GLB,, | Performed by: STUDENT IN AN ORGANIZED HEALTH CARE EDUCATION/TRAINING PROGRAM

## 2024-03-18 PROCEDURE — 3079F DIAST BP 80-89 MM HG: CPT | Mod: CPTII,S$GLB,, | Performed by: STUDENT IN AN ORGANIZED HEALTH CARE EDUCATION/TRAINING PROGRAM

## 2024-03-18 PROCEDURE — 3008F BODY MASS INDEX DOCD: CPT | Mod: CPTII,S$GLB,, | Performed by: STUDENT IN AN ORGANIZED HEALTH CARE EDUCATION/TRAINING PROGRAM

## 2024-03-18 PROCEDURE — 99205 OFFICE O/P NEW HI 60 MIN: CPT | Mod: S$GLB,,, | Performed by: STUDENT IN AN ORGANIZED HEALTH CARE EDUCATION/TRAINING PROGRAM

## 2024-03-18 RX ORDER — VENLAFAXINE HYDROCHLORIDE 37.5 MG/1
37.5 CAPSULE, EXTENDED RELEASE ORAL DAILY
Qty: 90 CAPSULE | Refills: 2 | Status: SHIPPED | OUTPATIENT
Start: 2024-03-18 | End: 2024-12-13

## 2024-03-18 RX ORDER — BUPROPION HYDROCHLORIDE 300 MG/1
300 TABLET ORAL DAILY
Qty: 90 TABLET | Refills: 2 | Status: SHIPPED | OUTPATIENT
Start: 2024-03-18 | End: 2024-12-13
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
## 2024-04-03 ENCOUNTER — PATIENT MESSAGE (OUTPATIENT)
Dept: PSYCHIATRY | Facility: CLINIC | Age: 34
End: 2024-04-03
Payer: COMMERCIAL

## 2024-04-03 DIAGNOSIS — F32.A DEPRESSION, UNSPECIFIED DEPRESSION TYPE: Primary | ICD-10-CM

## 2024-04-03 DIAGNOSIS — F90.9 ATTENTION DEFICIT HYPERACTIVITY DISORDER (ADHD), UNSPECIFIED ADHD TYPE: ICD-10-CM

## 2024-04-03 DIAGNOSIS — L70.0 ACNE VULGARIS: ICD-10-CM

## 2024-04-03 DIAGNOSIS — Z20.828 CONTACT WITH AND (SUSPECTED) EXPOSURE TO OTHER VIRAL COMMUNICABLE DISEASES: ICD-10-CM

## 2024-04-03 RX ORDER — DOXYCYCLINE 100 MG/1
TABLET ORAL
Qty: 30 TABLET | Refills: 0 | Status: SHIPPED | OUTPATIENT
Start: 2024-04-03

## 2024-04-03 NOTE — TELEPHONE ENCOUNTER
Care Due:                  Date            Visit Type   Department     Provider  --------------------------------------------------------------------------------                                NP -                              PRIMARY      [facility]  Last Visit: 11-      CARE ([facility])   MEDICINE       Danilo Bolden  Next Visit: None Scheduled  None         None Found                                                            Last  Test          Frequency    Reason                     Performed    Due Date  --------------------------------------------------------------------------------    CBC.........  12 months..  acyclovir................  Not Found    Overdue    Cr..........  12 months..  acyclovir................  Not Found    Overdue    [employer] Embedded Care Due Messages. Reference number: [id number].   4/03/2024 7:03:57 AM CDT

## 2024-04-05 RX ORDER — ACYCLOVIR 400 MG/1
400 TABLET ORAL 2 TIMES DAILY
Qty: 60 TABLET | Refills: 11 | Status: SHIPPED | OUTPATIENT
Start: 2024-04-05 | End: 2024-06-18 | Stop reason: SDUPTHER

## 2024-04-17 ENCOUNTER — CLINICAL SUPPORT (OUTPATIENT)
Dept: PSYCHIATRY | Facility: CLINIC | Age: 34
End: 2024-04-17
Payer: COMMERCIAL

## 2024-04-17 DIAGNOSIS — F90.9 ATTENTION DEFICIT HYPERACTIVITY DISORDER (ADHD), UNSPECIFIED ADHD TYPE: Primary | Chronic | ICD-10-CM

## 2024-04-17 LAB
AMPHET+METHAMPHET UR QL: NEGATIVE
BARBITURATES UR QL SCN>200 NG/ML: NEGATIVE
BENZODIAZ UR QL SCN>200 NG/ML: NEGATIVE
BZE UR QL SCN: NEGATIVE
CANNABINOIDS UR QL SCN: NEGATIVE
CREAT UR-MCNC: 158 MG/DL (ref 23–375)
ETHANOL UR-MCNC: 19 MG/DL
METHADONE UR QL SCN>300 NG/ML: NEGATIVE
OPIATES UR QL SCN: NEGATIVE
PCP UR QL SCN>25 NG/ML: NEGATIVE
TOXICOLOGY INFORMATION: ABNORMAL

## 2024-04-17 PROCEDURE — 80307 DRUG TEST PRSMV CHEM ANLYZR: CPT | Performed by: STUDENT IN AN ORGANIZED HEALTH CARE EDUCATION/TRAINING PROGRAM

## 2024-04-22 RX ORDER — DEXTROAMPHETAMINE SACCHARATE, AMPHETAMINE ASPARTATE MONOHYDRATE, DEXTROAMPHETAMINE SULFATE AND AMPHETAMINE SULFATE 2.5; 2.5; 2.5; 2.5 MG/1; MG/1; MG/1; MG/1
10 CAPSULE, EXTENDED RELEASE ORAL DAILY
Qty: 30 CAPSULE | Refills: 0 | Status: SHIPPED | OUTPATIENT
Start: 2024-04-22 | End: 2024-05-30 | Stop reason: SDUPTHER

## 2024-04-22 NOTE — TELEPHONE ENCOUNTER
reviewed. Adderal 10 XR last filled 1/22/24. Medical marijuana last filled 02/2024. Refill placed for adderall XR 10 mg #30 capsules

## 2024-05-24 ENCOUNTER — PATIENT MESSAGE (OUTPATIENT)
Dept: PSYCHIATRY | Facility: CLINIC | Age: 34
End: 2024-05-24
Payer: COMMERCIAL

## 2024-05-30 DIAGNOSIS — F90.9 ATTENTION DEFICIT HYPERACTIVITY DISORDER (ADHD), UNSPECIFIED ADHD TYPE: ICD-10-CM

## 2024-05-30 RX ORDER — DEXTROAMPHETAMINE SACCHARATE, AMPHETAMINE ASPARTATE MONOHYDRATE, DEXTROAMPHETAMINE SULFATE AND AMPHETAMINE SULFATE 2.5; 2.5; 2.5; 2.5 MG/1; MG/1; MG/1; MG/1
10 CAPSULE, EXTENDED RELEASE ORAL DAILY
Qty: 30 CAPSULE | Refills: 0 | Status: SHIPPED | OUTPATIENT
Start: 2024-05-30 | End: 2024-06-29

## 2024-06-10 ENCOUNTER — PATIENT MESSAGE (OUTPATIENT)
Dept: INTERNAL MEDICINE | Facility: CLINIC | Age: 34
End: 2024-06-10
Payer: COMMERCIAL

## 2024-06-18 DIAGNOSIS — Z20.828 CONTACT WITH AND (SUSPECTED) EXPOSURE TO OTHER VIRAL COMMUNICABLE DISEASES: ICD-10-CM

## 2024-06-18 NOTE — TELEPHONE ENCOUNTER
Care Due:                  Date            Visit Type   Department     Provider  --------------------------------------------------------------------------------                                NP -                              PRIMARY      [facility]  Last Visit: 11-      CARE ([facility])   MEDICINE       Danilo Bolden  Next Visit: None Scheduled  None         None Found                                                            Last  Test          Frequency    Reason                     Performed    Due Date  --------------------------------------------------------------------------------    CBC.........  12 months..  acyclovir................  Not Found    Overdue    Cr..........  12 months..  acyclovir................  Not Found    Overdue    [employer] Embedded Care Due Messages. Reference number: [id number].   6/18/2024 12:27:44 PM CDT

## 2024-06-19 RX ORDER — ACYCLOVIR 400 MG/1
400 TABLET ORAL 2 TIMES DAILY
Qty: 180 TABLET | Refills: 0 | Status: SHIPPED | OUTPATIENT
Start: 2024-06-19

## 2024-06-19 NOTE — TELEPHONE ENCOUNTER
Refill Routing Note   Medication(s) are not appropriate for processing by [facility] for the following reason(s):        Required labs outdated    [facility] action(s):  Defer     Requires labs : Yes             Appointments  past 12m or future 3m with PCP    Date Provider   Last Visit   11/8/2023 Danilo Bolden MD   Next Visit   Visit date not found Danilo Bolden MD   ED visits in past 90 days: 0        Note composed:9:55 AM 06/19/2024

## 2024-06-24 ENCOUNTER — OFFICE VISIT (OUTPATIENT)
Dept: PSYCHIATRY | Facility: CLINIC | Age: 34
End: 2024-06-24
Payer: COMMERCIAL

## 2024-06-24 VITALS
WEIGHT: 210 LBS | HEART RATE: 96 BPM | DIASTOLIC BLOOD PRESSURE: 86 MMHG | BODY MASS INDEX: 30.57 KG/M2 | SYSTOLIC BLOOD PRESSURE: 134 MMHG

## 2024-06-24 DIAGNOSIS — F41.9 ANXIETY: ICD-10-CM

## 2024-06-24 DIAGNOSIS — F90.0 ADHD (ATTENTION DEFICIT HYPERACTIVITY DISORDER), INATTENTIVE TYPE: Primary | ICD-10-CM

## 2024-06-24 DIAGNOSIS — F32.A DEPRESSION, UNSPECIFIED DEPRESSION TYPE: ICD-10-CM

## 2024-06-24 PROCEDURE — 3008F BODY MASS INDEX DOCD: CPT | Mod: CPTII,S$GLB,, | Performed by: STUDENT IN AN ORGANIZED HEALTH CARE EDUCATION/TRAINING PROGRAM

## 2024-06-24 PROCEDURE — 3079F DIAST BP 80-89 MM HG: CPT | Mod: CPTII,S$GLB,, | Performed by: STUDENT IN AN ORGANIZED HEALTH CARE EDUCATION/TRAINING PROGRAM

## 2024-06-24 PROCEDURE — 99999 PR PBB SHADOW E&M-EST. PATIENT-LVL II: CPT | Mod: PBBFAC,,, | Performed by: STUDENT IN AN ORGANIZED HEALTH CARE EDUCATION/TRAINING PROGRAM

## 2024-06-24 PROCEDURE — 99213 OFFICE O/P EST LOW 20 MIN: CPT | Mod: S$GLB,,, | Performed by: STUDENT IN AN ORGANIZED HEALTH CARE EDUCATION/TRAINING PROGRAM

## 2024-06-24 PROCEDURE — 3075F SYST BP GE 130 - 139MM HG: CPT | Mod: CPTII,S$GLB,, | Performed by: STUDENT IN AN ORGANIZED HEALTH CARE EDUCATION/TRAINING PROGRAM

## 2024-06-24 RX ORDER — DEXTROAMPHETAMINE SACCHARATE, AMPHETAMINE ASPARTATE MONOHYDRATE, DEXTROAMPHETAMINE SULFATE AND AMPHETAMINE SULFATE 5; 5; 5; 5 MG/1; MG/1; MG/1; MG/1
20 CAPSULE, EXTENDED RELEASE ORAL EVERY MORNING
Qty: 30 CAPSULE | Refills: 0 | Status: SHIPPED | OUTPATIENT
Start: 2024-06-24 | End: 2024-06-24 | Stop reason: SDUPTHER

## 2024-06-24 RX ORDER — VENLAFAXINE HYDROCHLORIDE 37.5 MG/1
37.5 CAPSULE, EXTENDED RELEASE ORAL DAILY
Qty: 90 CAPSULE | Refills: 1 | Status: SHIPPED | OUTPATIENT
Start: 2024-06-24 | End: 2025-03-21

## 2024-06-24 RX ORDER — DEXTROAMPHETAMINE SACCHARATE, AMPHETAMINE ASPARTATE MONOHYDRATE, DEXTROAMPHETAMINE SULFATE AND AMPHETAMINE SULFATE 5; 5; 5; 5 MG/1; MG/1; MG/1; MG/1
20 CAPSULE, EXTENDED RELEASE ORAL EVERY MORNING
Qty: 30 CAPSULE | Refills: 0 | Status: SHIPPED | OUTPATIENT
Start: 2024-08-20

## 2024-06-24 RX ORDER — DEXTROAMPHETAMINE SACCHARATE, AMPHETAMINE ASPARTATE MONOHYDRATE, DEXTROAMPHETAMINE SULFATE AND AMPHETAMINE SULFATE 5; 5; 5; 5 MG/1; MG/1; MG/1; MG/1
20 CAPSULE, EXTENDED RELEASE ORAL EVERY MORNING
Qty: 30 CAPSULE | Refills: 0 | Status: SHIPPED | OUTPATIENT
Start: 2024-08-20 | End: 2024-06-24 | Stop reason: SDUPTHER

## 2024-06-24 RX ORDER — DEXTROAMPHETAMINE SACCHARATE, AMPHETAMINE ASPARTATE MONOHYDRATE, DEXTROAMPHETAMINE SULFATE AND AMPHETAMINE SULFATE 5; 5; 5; 5 MG/1; MG/1; MG/1; MG/1
20 CAPSULE, EXTENDED RELEASE ORAL EVERY MORNING
Qty: 30 CAPSULE | Refills: 0 | Status: SHIPPED | OUTPATIENT
Start: 2024-06-24

## 2024-06-24 RX ORDER — DEXTROAMPHETAMINE SACCHARATE, AMPHETAMINE ASPARTATE MONOHYDRATE, DEXTROAMPHETAMINE SULFATE AND AMPHETAMINE SULFATE 5; 5; 5; 5 MG/1; MG/1; MG/1; MG/1
20 CAPSULE, EXTENDED RELEASE ORAL EVERY MORNING
Qty: 30 CAPSULE | Refills: 0 | Status: SHIPPED | OUTPATIENT
Start: 2024-07-22

## 2024-06-24 RX ORDER — DEXTROAMPHETAMINE SACCHARATE, AMPHETAMINE ASPARTATE MONOHYDRATE, DEXTROAMPHETAMINE SULFATE AND AMPHETAMINE SULFATE 5; 5; 5; 5 MG/1; MG/1; MG/1; MG/1
20 CAPSULE, EXTENDED RELEASE ORAL EVERY MORNING
Qty: 30 CAPSULE | Refills: 0 | Status: SHIPPED | OUTPATIENT
Start: 2024-07-22 | End: 2024-06-24 | Stop reason: SDUPTHER

## 2024-06-24 RX ORDER — BUPROPION HYDROCHLORIDE 300 MG/1
300 TABLET ORAL DAILY
Qty: 90 TABLET | Refills: 1 | Status: SHIPPED | OUTPATIENT
Start: 2024-06-24 | End: 2025-03-21

## 2024-06-24 NOTE — PROGRESS NOTES
At around 0120 patient found to be actively seizing lasting approximately 2 minutes. Pulse began to get weak and thready. BP dropped 50's systolic. Dr. Mars came to bedside. Patient started on levophed. It is unknown if patient has seizure history.    OUTPATIENT PSYCHIATRY FOLLOW UP VISIT    ENCOUNTER DATE:  7/1/2024  SITE:  [facility]  LENGTH OF SESSION:  30 minutes      CHIEF COMPLAINT:  No chief complaint on file.      HISTORY OF PRESENTING ILLNESS:  Sheldon Sanon is a 33 y.o. male with history of ADHD, depression and anxiety who presents for follow up appointment.      Interval history as told by Patient - & or family/friend/spouse/caregiver with pts permission    Arrives with 15 min remaining in appt today. Reports doing better on Adderall 10 XR but symptoms are not all the way controlled. Asks about increasing dose. States taking 20 mg XR in the past which was much more beneficial. Continues to have difficulty focusing and finds himself easily distracted and forgetful. Working in [employer] at [employer]. Enjoys this job. Good appetite. Sleeping well.   Mood and anxiety are both well controlled at this time. Looking for a therapist. Referral was placed for [facility] at last visit.   No changes to overall state of health since last visit. Discontinued marijuana use. Only uses alcohol occasionally and denies using other substances.  reviewed.   Denies suicidal thoughts.   Discussed resident transition next month.     PSYCHIATRIC REVIEW OF SYSTEMS:(none/ yes- better/worse/stable/& what symptoms)    Symptoms of Depression: denies     Symptoms of Anxiety/ panic attacks: anxiety at baseline, denies functional impairment from sx    Symptoms of Kristie/Hypomania: denies    Symptoms of psychosis: denies    Sleep: sleeping better with adderall    Appetite: normal     Other:    Alcohol: social     Illicit Drugs: denies    Psychosocial stressors:     Risk Parameters:  Patient reports no suicidal ideation  Patient reports no homicidal ideation  Patient reports no self-injurious behavior  Patient reports no violent behavior    PSYCHIATRIC MED REVIEW    Current psych meds  Medication side effects:  denies  Medication compliance:   yes    Previous psych meds trials  Vyvanse    PAST PSYCHIATRIC, MEDICAL, AND SOCIAL HISTORY REVIEWED  The patient's past medical, family and social history have been reviewed and updated as appropriate within the electronic medical record - see encounter notes.    MEDICAL REVIEW OF SYSTEMS:  Complete review of systems performed covering Constitutional, Musculoskeletal, Neurologic.  All systems negative    ALL MEDICATIONS:    Current Outpatient Medications:     acyclovir (ZOVIRAX) 400 MG tablet, Take 1 tablet (400 mg total) by mouth 2 (two) times daily., Disp: 180 tablet, Rfl: 0    buPROPion (WELLBUTRIN XL) 300 MG 24 hr tablet, Take 1 tablet (300 mg total) by mouth once daily., Disp: 90 tablet, Rfl: 1    CHORIONIC GONADOTROPIN, HUMAN INJ, Inject 0.5 mLs into the skin twice a week., Disp: , Rfl:     clindamycin (CLEOCIN T) 1 % lotion, Apply to affected areas of back BID prn acne., Disp: 60 mL, Rfl: 3    dextroamphetamine-amphetamine (ADDERALL XR) 20 MG 24 hr capsule, Take 1 capsule (20 mg total) by mouth every morning., Disp: 30 capsule, Rfl: 0    [START ON 8/20/2024] dextroamphetamine-amphetamine (ADDERALL XR) 20 MG 24 hr capsule, Take 1 capsule (20 mg total) by mouth every morning., Disp: 30 capsule, Rfl: 0    [START ON 7/22/2024] dextroamphetamine-amphetamine (ADDERALL XR) 20 MG 24 hr capsule, Take 1 capsule (20 mg total) by mouth every morning., Disp: 30 capsule, Rfl: 0    doxycycline monohydrate 100 mg Tab, Take 1 po qday pc. Take with plenty of water., Disp: 30 tablet, Rfl: 0    tazarotene (TAZORAC) 0.05 % Crea cream, Apply a pea-sized amount to entire back qhs., Disp: 60 g, Rfl: 3    testosterone cypionate (DEPOTESTOTERONE CYPIONATE) 100 mg/mL injection, Inject 80 mg into the muscle twice a week., Disp: , Rfl:     venlafaxine (EFFEXOR-XR) 37.5 MG 24 hr capsule, Take 1 capsule (37.5 mg total) by mouth once daily., Disp: 90 capsule, Rfl: 1    ALLERGIES:  Review of patient's allergies indicates:  No Known  "Allergies    RELEVANT LABS/STUDIES:    Lab Results   Component Value Date    WBC 5.52 05/02/2017    HGB 15.7 05/02/2017    HCT 45.1 05/02/2017    MCV 92 05/02/2017     05/02/2017     BMP  Lab Results   Component Value Date     05/02/2017    K 4.5 05/02/2017     05/02/2017    CO2 29 05/02/2017    BUN 13 05/02/2017    CREATININE 1.0 05/02/2017    CALCIUM 9.7 05/02/2017    ANIONGAP 8 05/02/2017    ESTGFRAFRICA >60 05/02/2017    EGFRNONAA >60 05/02/2017     Lab Results   Component Value Date    ALT 22 05/02/2017    AST 16 05/02/2017    ALKPHOS 62 05/02/2017    BILITOT 1.0 05/02/2017     No results found for: "TSH"  No results found for: "LABA1C", "HGBA1C"    VITALS  Vitals:    06/24/24 1416   BP: 134/86   Pulse: 96   Weight: 95.3 kg (210 lb)       PHYSICAL EXAM  General: well developed, well nourished  Neurologic:   Gait: Normal   Psychomotor signs:  No involuntary movements or tremor  AIMS: none    PSYCHIATRIC EXAM:     Mental Status Exam:  Appearance: unremarkable, age appropriate  Behavior/Cooperation: normal, friendly and cooperative  Speech: normal tone, normal rate, normal pitch, normal volume  Language: uses words appropriately; NO aphasia or dysarthria  Mood: euthymic  Affect  Thought Process: normal and logical  Thought Content: normal, no suicidality, no homicidality, delusions, or paranoia  Level of Consciousness: Alert and Oriented x3  Memory:  Intact  Attention/concentration: appropriate for age/education.   Fund of Knowledge: appears adequate  Insight:  Intact  Judgment:Intact       IMPRESSION:    Sheldon Sanon is a 33 y.o. male with history of ADHD, anxiety, depression who presents for follow up appointment for medication management. Currently taking Adderall XR, Effexor 37.5 and Wellbutrin 300--denies any side effects from this regimen and would like to try increased dose of adderall (previously took 20 mg XR). Will increase dose today and monitor for effect. "     Status/Progress:  Based on the examination today, the patient's problem(s) is/are improved.  New problems have not been presented today.     DIAGNOSES:    ICD-10-CM ICD-9-CM   1. ADHD (attention deficit hyperactivity disorder), inattentive type  F90.0 314.00   2. Anxiety  F41.9 300.00   3. Depression, unspecified depression type  F32.A 311       PLAN:  Psych Med:  Continue Effexor 37.5 mg daily and Wellbutrin 300 mg daily for ADHD augmentiation, mood and anxiety  UDS negative,  reviewed   Increase Adderall XR to 20 mg daily   Discussed below risks of stimulant with patient   Increased risk for heart problems, high blood pressure, and sudden cardiac death.   Decreased appetite. People seem to be less hungry during the middle of the day, but they are often hungry by dinnertime as the medication wears off.   Sleep problems. If a person cannot fall asleep, the doctor may prescribe a lower dose. The doctor might also suggest that the medication is taken earlier in the day, or stop the afternoon or evening dose.    Stomach aches and headaches.  Stimulant medications may lead to drug abuse or dependence, but the risk is highest for those patient taking this class of medication who do not have ADHD. Research shows that teens with ADHD who took stimulant medications were less likely to abuse drugs than those who did not take stimulant medications. Proper diagnosis is the key to minimizing this risk.    Discussed with patient informed consent, risks vs. benefits, alternative treatments, side effect profile and the inherent unpredictability of individual responses to these treatments. Answered any questions patient may have had. The patient expresses understanding of the above and displays the capacity to agree with this current plan     Other:     RETURN TO CLINIC: 3 months       Terrie Rangel MD  7/1/2024 2:24 PM

## 2024-07-03 NOTE — PROGRESS NOTES
STAFF COMMENTS: I have discussed pt with Dr. Rangel and reviewed the history and exam. I agree and concur with the assessment and plan.

## 2024-09-03 ENCOUNTER — PATIENT MESSAGE (OUTPATIENT)
Dept: PSYCHIATRY | Facility: CLINIC | Age: 34
End: 2024-09-03
Payer: COMMERCIAL

## 2024-09-03 DIAGNOSIS — F90.0 ADHD (ATTENTION DEFICIT HYPERACTIVITY DISORDER), INATTENTIVE TYPE: ICD-10-CM

## 2024-09-03 RX ORDER — DEXTROAMPHETAMINE SACCHARATE, AMPHETAMINE ASPARTATE MONOHYDRATE, DEXTROAMPHETAMINE SULFATE AND AMPHETAMINE SULFATE 5; 5; 5; 5 MG/1; MG/1; MG/1; MG/1
20 CAPSULE, EXTENDED RELEASE ORAL EVERY MORNING
Qty: 30 CAPSULE | Refills: 0 | Status: SHIPPED | OUTPATIENT
Start: 2024-09-03

## 2024-10-11 DIAGNOSIS — F90.0 ADHD (ATTENTION DEFICIT HYPERACTIVITY DISORDER), INATTENTIVE TYPE: ICD-10-CM

## 2024-10-12 DIAGNOSIS — F90.0 ADHD (ATTENTION DEFICIT HYPERACTIVITY DISORDER), INATTENTIVE TYPE: ICD-10-CM

## 2024-10-12 RX ORDER — DEXTROAMPHETAMINE SACCHARATE, AMPHETAMINE ASPARTATE MONOHYDRATE, DEXTROAMPHETAMINE SULFATE AND AMPHETAMINE SULFATE 5; 5; 5; 5 MG/1; MG/1; MG/1; MG/1
20 CAPSULE, EXTENDED RELEASE ORAL EVERY MORNING
Qty: 30 CAPSULE | Refills: 0 | Status: CANCELLED | OUTPATIENT
Start: 2024-10-12

## 2024-10-14 RX ORDER — DEXTROAMPHETAMINE SACCHARATE, AMPHETAMINE ASPARTATE MONOHYDRATE, DEXTROAMPHETAMINE SULFATE AND AMPHETAMINE SULFATE 5; 5; 5; 5 MG/1; MG/1; MG/1; MG/1
20 CAPSULE, EXTENDED RELEASE ORAL EVERY MORNING
Qty: 30 CAPSULE | Refills: 0 | Status: SHIPPED | OUTPATIENT
Start: 2024-10-14 | End: 2024-10-18 | Stop reason: SDUPTHER

## 2024-10-14 NOTE — TELEPHONE ENCOUNTER
Former pt of resident Dr. Rangel requesting Adderall refill. L/s June 2024 when plan made to increase dose. Has f/u with new provider Dr. Ventura 11/6/24. [place] PDMP reviewed- no irregularities noted, last filled 9/5/24. Will consult with Dr. Harrington for review/approval, as deemed appropriate, of one month supply of requested med.

## 2024-10-17 ENCOUNTER — PATIENT MESSAGE (OUTPATIENT)
Dept: PSYCHIATRY | Facility: CLINIC | Age: 34
End: 2024-10-17
Payer: COMMERCIAL

## 2024-10-17 DIAGNOSIS — F90.0 ADHD (ATTENTION DEFICIT HYPERACTIVITY DISORDER), INATTENTIVE TYPE: ICD-10-CM

## 2024-10-17 DIAGNOSIS — F90.0 ADHD (ATTENTION DEFICIT HYPERACTIVITY DISORDER), INATTENTIVE TYPE: Primary | ICD-10-CM

## 2024-10-17 RX ORDER — DEXTROAMPHETAMINE SACCHARATE, AMPHETAMINE ASPARTATE MONOHYDRATE, DEXTROAMPHETAMINE SULFATE AND AMPHETAMINE SULFATE 5; 5; 5; 5 MG/1; MG/1; MG/1; MG/1
20 CAPSULE, EXTENDED RELEASE ORAL EVERY MORNING
Qty: 30 CAPSULE | Refills: 0 | Status: CANCELLED | OUTPATIENT
Start: 2024-10-17

## 2024-10-18 RX ORDER — DEXTROAMPHETAMINE SACCHARATE, AMPHETAMINE ASPARTATE MONOHYDRATE, DEXTROAMPHETAMINE SULFATE AND AMPHETAMINE SULFATE 5; 5; 5; 5 MG/1; MG/1; MG/1; MG/1
20 CAPSULE, EXTENDED RELEASE ORAL EVERY MORNING
Qty: 30 CAPSULE | Refills: 0 | Status: SHIPPED | OUTPATIENT
Start: 2024-10-18

## 2024-10-18 RX ORDER — DEXTROAMPHETAMINE SACCHARATE, AMPHETAMINE ASPARTATE MONOHYDRATE, DEXTROAMPHETAMINE SULFATE AND AMPHETAMINE SULFATE 5; 5; 5; 5 MG/1; MG/1; MG/1; MG/1
20 CAPSULE, EXTENDED RELEASE ORAL EVERY MORNING
Qty: 30 CAPSULE | Refills: 0 | Status: CANCELLED | OUTPATIENT
Start: 2024-10-18

## 2024-10-19 NOTE — TELEPHONE ENCOUNTER
Prescription sent to [facility] pharmacy. Needs to make follow-up with me in November for further refills. No show on 10/9/24.     Asael Ventura MD  [facility]

## 2025-01-03 ENCOUNTER — OFFICE VISIT (OUTPATIENT)
Dept: INTERNAL MEDICINE | Facility: CLINIC | Age: 35
End: 2025-01-03
Payer: COMMERCIAL

## 2025-01-03 VITALS
OXYGEN SATURATION: 98 % | RESPIRATION RATE: 18 BRPM | HEART RATE: 108 BPM | WEIGHT: 201.06 LBS | BODY MASS INDEX: 29.78 KG/M2 | HEIGHT: 69 IN | DIASTOLIC BLOOD PRESSURE: 88 MMHG | SYSTOLIC BLOOD PRESSURE: 128 MMHG

## 2025-01-03 DIAGNOSIS — Z23 NEED FOR VACCINATION: ICD-10-CM

## 2025-01-03 DIAGNOSIS — F90.9 ATTENTION DEFICIT HYPERACTIVITY DISORDER (ADHD), UNSPECIFIED ADHD TYPE: Chronic | ICD-10-CM

## 2025-01-03 DIAGNOSIS — R79.89 LOW TESTOSTERONE IN MALE: ICD-10-CM

## 2025-01-03 DIAGNOSIS — F41.9 ANXIETY: ICD-10-CM

## 2025-01-03 DIAGNOSIS — Z20.828 CONTACT WITH AND (SUSPECTED) EXPOSURE TO OTHER VIRAL COMMUNICABLE DISEASES: ICD-10-CM

## 2025-01-03 DIAGNOSIS — Z00.00 ENCOUNTER FOR ANNUAL GENERAL MEDICAL EXAMINATION WITHOUT ABNORMAL FINDINGS IN ADULT: Primary | ICD-10-CM

## 2025-01-03 PROCEDURE — 99999 PR PBB SHADOW E&M-EST. PATIENT-LVL IV: CPT | Mod: PBBFAC,,, | Performed by: FAMILY MEDICINE

## 2025-01-03 RX ORDER — ISOTRETINOIN 20 MG/1
40 CAPSULE ORAL DAILY
COMMUNITY
Start: 2024-12-16

## 2025-01-03 NOTE — PROGRESS NOTES
Subjective:     Patient ID: Sheldon Sanon is a 34 y.o. male.   Chief Complaint: Annual Exam    HPI:  History of Present Illness    CHIEF COMPLAINT:  Patient presents today for a checkup.    He reports increased susceptibility to staph infections and is currently under dermatologist care. He notes flaky skin in the ears, which is a common site for staph infections. He has completed two rounds of antibiotics along with topical creams and lotions. He is concerned about possible immune system involvement with how often he is having these infections.    His weight currently fluctuates between 190-200 lbs, decreased from a maximum of 220 lbs in the past year. He is actively improving his dietary habits and reports positive results with [employer] service.          Review of Problems & History:  Patient Active Problem List   Diagnosis    ADHD    Anxiety    Contact with and (suspected) exposure to other viral communicable diseases    Depression    Low testosterone in male    Left hip pain    Bowel habit changes      Past Medical History:   Diagnosis Date    Acne     ADD (attention deficit disorder)       Past Surgical History:   Procedure Laterality Date    none        Social History     Socioeconomic History    Marital status: Single   Occupational History    Occupation: real estate    Tobacco Use    Smoking status: Never    Smokeless tobacco: Never   Substance and Sexual Activity    Alcohol use: Yes     Alcohol/week: 4.0 standard drinks of alcohol     Types: 4 Cans of beer per week    Drug use: Yes     Types: Marijuana     Comment: monthly     Sexual activity: Yes     Partners: Female     Birth control/protection: Condom     Social Drivers of Health     Financial Resource Strain: Low Risk  (3/18/2024)    Overall Financial Resource Strain (CARDIA)     Difficulty of Paying Living Expenses: Not very hard   Food Insecurity: No Food Insecurity (3/18/2024)    Hunger Vital Sign     Worried About Running Out of Food in  the Last Year: Never true     Ran Out of Food in the Last Year: Never true   Transportation Needs: No Transportation Needs (3/18/2024)    PRAPARE - Transportation     Lack of Transportation (Medical): No     Lack of Transportation (Non-Medical): No   Physical Activity: Sufficiently Active (3/18/2024)    Exercise Vital Sign     Days of Exercise per Week: 3 days     Minutes of Exercise per Session: 90 min   Stress: Stress Concern Present (3/18/2024)    [employer] - Occupational Stress Questionnaire     Feeling of Stress : To some extent   Housing Stability: Low Risk  (3/18/2024)    Housing Stability Vital Sign     Unable to Pay for Housing in the Last Year: No     Number of Places Lived in the Last Year: 1     Unstable Housing in the Last Year: No        Medication Review:    Current Outpatient Medications:     ABSORICA 20 mg capsule, Take 40 mg by mouth once daily., Disp: , Rfl:     acyclovir (ZOVIRAX) 400 MG tablet, Take 1 tablet (400 mg total) by mouth 2 (two) times daily., Disp: 180 tablet, Rfl: 0    buPROPion (WELLBUTRIN XL) 300 MG 24 hr tablet, Take 1 tablet (300 mg total) by mouth once daily., Disp: 90 tablet, Rfl: 1    CHORIONIC GONADOTROPIN, HUMAN INJ, Inject 0.5 mLs into the skin twice a week., Disp: , Rfl:     clindamycin (CLEOCIN T) 1 % lotion, Apply to affected areas of back BID prn acne., Disp: 60 mL, Rfl: 3    dextroamphetamine-amphetamine (ADDERALL XR) 20 MG 24 hr capsule, Take 1 capsule (20 mg total) by mouth every morning., Disp: 30 capsule, Rfl: 0    doxycycline monohydrate 100 mg Tab, Take 1 po qday pc. Take with plenty of water., Disp: 30 tablet, Rfl: 0    tazarotene (TAZORAC) 0.05 % Crea cream, Apply a pea-sized amount to entire back qhs., Disp: 60 g, Rfl: 3    testosterone cypionate (DEPOTESTOTERONE CYPIONATE) 100 mg/mL injection, Inject 80 mg into the muscle twice a week., Disp: , Rfl:     venlafaxine (EFFEXOR-XR) 37.5 MG 24 hr capsule, Take 1 capsule (37.5 mg  "total) by mouth once daily., Disp: 90 capsule, Rfl: 1     Review of Systems   Constitutional:  Negative for activity change and unexpected weight change.   HENT:  Positive for rhinorrhea. Negative for hearing loss and trouble swallowing.    Eyes:  Negative for discharge and visual disturbance.   Respiratory:  Negative for chest tightness and wheezing.    Cardiovascular:  Negative for chest pain and palpitations.   Gastrointestinal:  Negative for blood in stool, constipation, diarrhea and vomiting.   Endocrine: Negative for polydipsia and polyuria.   Genitourinary:  Negative for difficulty urinating, hematuria and urgency.   Musculoskeletal:  Negative for arthralgias, joint swelling and neck pain.   Neurological:  Negative for weakness and headaches.   Psychiatric/Behavioral:  Negative for confusion and dysphoric mood.           Objective:      Vitals:    01/03/25 0821   BP: 128/88   Pulse: 108   Resp: 18   SpO2: 98%   Weight: 91.2 kg (201 lb 1 oz)   Height: 5' 9" (1.753 m)      Physical Exam  Vitals reviewed.   Constitutional:       General: He is not in acute distress.     Appearance: Normal appearance. He is not ill-appearing.   HENT:      Head: Normocephalic and atraumatic.      Right Ear: Tympanic membrane, ear canal and external ear normal. There is no impacted cerumen.      Left Ear: Tympanic membrane, ear canal and external ear normal. There is no impacted cerumen.      Nose: Nose normal. No congestion or rhinorrhea.      Mouth/Throat:      Mouth: Mucous membranes are moist.      Pharynx: Oropharynx is clear. No oropharyngeal exudate or posterior oropharyngeal erythema.   Eyes:      General: No scleral icterus.        Right eye: No discharge.         Left eye: No discharge.      Conjunctiva/sclera: Conjunctivae normal.   Neck:      Thyroid: No thyroid mass, thyromegaly or thyroid tenderness.   Cardiovascular:      Rate and Rhythm: Normal rate and regular rhythm.      Pulses: Normal pulses.      Heart sounds: " Normal heart sounds. No murmur heard.     No friction rub. No gallop.   Pulmonary:      Effort: Pulmonary effort is normal. No respiratory distress.      Breath sounds: Normal breath sounds. No stridor. No wheezing, rhonchi or rales.   Abdominal:      General: Abdomen is flat. Bowel sounds are normal. There is no distension.      Palpations: Abdomen is soft. There is no mass.      Tenderness: There is no abdominal tenderness. There is no guarding.      Hernia: No hernia is present.   Musculoskeletal:         General: No swelling or deformity. Normal range of motion.      Cervical back: Normal range of motion and neck supple. No tenderness.   Lymphadenopathy:      Cervical: No cervical adenopathy.   Skin:     General: Skin is warm and dry.   Neurological:      General: No focal deficit present.      Mental Status: He is alert and oriented to person, place, and time. Mental status is at baseline.      Gait: Gait normal.      Deep Tendon Reflexes: Reflexes normal.   Psychiatric:         Mood and Affect: Mood normal.         Behavior: Behavior normal.         Thought Content: Thought content normal.         Judgment: Judgment normal.           Assessment/Plan:             ICD-10-CM ICD-9-CM   1. Encounter for annual general medical examination without abnormal findings in adult  Z00.00 V70.0   2. Attention deficit hyperactivity disorder (ADHD), unspecified ADHD type  F90.9 314.01   3. Low testosterone in male  R79.89 790.99   4. Anxiety  F41.9 300.00   5. Need for vaccination  Z23 V05.9     Orders Placed This Encounter   Procedures    Influenza - Trivalent - PF (ADULT)    Comprehensive Metabolic Panel    CBC Auto Differential    Lipid Panel    Hemoglobin A1C    TSH    T4, Free    IgG       Assessment & Plan    - Assessed increased susceptibility to infections, particularly staph, potentially related to current medication regimen  - Considered impact of weight fluctuations and dietary changes on overall health  - Added  IgG to labs to rule out potential immune issues    1. Encounter for annual general medical examination without abnormal findings in adult (Primary)  Health maintenance updated  Chronic issues reviewed  Fasting labs ordered  - Comprehensive Metabolic Panel; Future  - CBC Auto Differential; Future  - Lipid Panel; Future  - Hemoglobin A1C; Future  - TSH; Future  - T4, Free; Future  - IgG; Future    2. Attention deficit hyperactivity disorder (ADHD), unspecified ADHD type  Stable, managed by specialist  No change to therapy    3. Low testosterone in male  Stable, managed externally  F/u labs for monitoring  Pt to provide hormone level labs drawn at outside facility    4. Anxiety  Stable, managed by specialist  No change to current therapy    5. Need for vaccination  Influenza administered in clinic  - Influenza - Trivalent - PF (ADULT)                No follow-ups on file.     Danilo Bolden MD, [facility]  Family Medicine Physician  [facility]  01/03/2025    This note was generated with the assistance of ambient listening technology. Verbal consent was obtained by the patient and accompanying visitor(s) for the recording of patient appointment to facilitate this note. I attest to having reviewed and edited the generated note for accuracy, though some syntax or spelling errors may persist. Please contact the author of this note for any clarification.

## 2025-01-03 NOTE — TELEPHONE ENCOUNTER
Care Due:                  Date            Visit Type   Department     Provider  --------------------------------------------------------------------------------                                MYCHART                              ANNUAL                              CHECKUP/PHY  [facility] INTERNAL  Last Visit: 01-      [facility]       Dainlo Bolden  Next Visit: None Scheduled  None         None Found                                                            Last  Test          Frequency    Reason                     Performed    Due Date  --------------------------------------------------------------------------------    CBC.........  12 months..  acyclovir................  Not Found    Overdue    Cr..........  12 months..  acyclovir................  Not Found    Overdue    [employer] Embedded Care Due Messages. Reference number: [id number].   1/03/2025 5:12:01 PM CST

## 2025-01-06 ENCOUNTER — LAB VISIT (OUTPATIENT)
Dept: LAB | Facility: HOSPITAL | Age: 35
End: 2025-01-06
Payer: COMMERCIAL

## 2025-01-06 DIAGNOSIS — Z00.00 ENCOUNTER FOR ANNUAL GENERAL MEDICAL EXAMINATION WITHOUT ABNORMAL FINDINGS IN ADULT: ICD-10-CM

## 2025-01-06 LAB
ALBUMIN SERPL BCP-MCNC: 4.1 G/DL (ref 3.5–5.2)
ALP SERPL-CCNC: 76 U/L (ref 40–150)
ALT SERPL W/O P-5'-P-CCNC: 43 U/L (ref 10–44)
ANION GAP SERPL CALC-SCNC: 10 MMOL/L (ref 8–16)
AST SERPL-CCNC: 28 U/L (ref 10–40)
BASOPHILS # BLD AUTO: 0.03 K/UL (ref 0–0.2)
BASOPHILS NFR BLD: 0.4 % (ref 0–1.9)
BILIRUB SERPL-MCNC: 0.5 MG/DL (ref 0.1–1)
BUN SERPL-MCNC: 11 MG/DL (ref 6–20)
CALCIUM SERPL-MCNC: 9.7 MG/DL (ref 8.7–10.5)
CHLORIDE SERPL-SCNC: 103 MMOL/L (ref 95–110)
CHOLEST SERPL-MCNC: 201 MG/DL (ref 120–199)
CHOLEST/HDLC SERPL: 5.6 {RATIO} (ref 2–5)
CO2 SERPL-SCNC: 26 MMOL/L (ref 23–29)
CREAT SERPL-MCNC: 0.9 MG/DL (ref 0.5–1.4)
DIFFERENTIAL METHOD BLD: ABNORMAL
EOSINOPHIL # BLD AUTO: 0.1 K/UL (ref 0–0.5)
EOSINOPHIL NFR BLD: 1.3 % (ref 0–8)
ERYTHROCYTE [DISTWIDTH] IN BLOOD BY AUTOMATED COUNT: 13.4 % (ref 11.5–14.5)
EST. GFR  (NO RACE VARIABLE): >60 ML/MIN/1.73 M^2
ESTIMATED AVG GLUCOSE: 100 MG/DL (ref 68–131)
GLUCOSE SERPL-MCNC: 88 MG/DL (ref 70–110)
HBA1C MFR BLD: 5.1 % (ref 4–5.6)
HCT VFR BLD AUTO: 48.7 % (ref 40–54)
HDLC SERPL-MCNC: 36 MG/DL (ref 40–75)
HDLC SERPL: 17.9 % (ref 20–50)
HGB BLD-MCNC: 16.4 G/DL (ref 14–18)
IGG SERPL-MCNC: 1403 MG/DL (ref 650–1600)
IMM GRANULOCYTES # BLD AUTO: 0.03 K/UL (ref 0–0.04)
IMM GRANULOCYTES NFR BLD AUTO: 0.4 % (ref 0–0.5)
LDLC SERPL CALC-MCNC: 127 MG/DL (ref 63–159)
LYMPHOCYTES # BLD AUTO: 1.5 K/UL (ref 1–4.8)
LYMPHOCYTES NFR BLD: 18.3 % (ref 18–48)
MCH RBC QN AUTO: 31.4 PG (ref 27–31)
MCHC RBC AUTO-ENTMCNC: 33.7 G/DL (ref 32–36)
MCV RBC AUTO: 93 FL (ref 82–98)
MONOCYTES # BLD AUTO: 0.5 K/UL (ref 0.3–1)
MONOCYTES NFR BLD: 6.3 % (ref 4–15)
NEUTROPHILS # BLD AUTO: 6.1 K/UL (ref 1.8–7.7)
NEUTROPHILS NFR BLD: 73.3 % (ref 38–73)
NONHDLC SERPL-MCNC: 165 MG/DL
NRBC BLD-RTO: 0 /100 WBC
PLATELET # BLD AUTO: 458 K/UL (ref 150–450)
PMV BLD AUTO: 9 FL (ref 9.2–12.9)
POTASSIUM SERPL-SCNC: 4.2 MMOL/L (ref 3.5–5.1)
PROT SERPL-MCNC: 8.5 G/DL (ref 6–8.4)
RBC # BLD AUTO: 5.23 M/UL (ref 4.6–6.2)
SODIUM SERPL-SCNC: 139 MMOL/L (ref 136–145)
T4 FREE SERPL-MCNC: 0.95 NG/DL (ref 0.71–1.51)
TRIGL SERPL-MCNC: 190 MG/DL (ref 30–150)
TSH SERPL DL<=0.005 MIU/L-ACNC: 0.86 UIU/ML (ref 0.4–4)
WBC # BLD AUTO: 8.35 K/UL (ref 3.9–12.7)

## 2025-01-06 PROCEDURE — 85025 COMPLETE CBC W/AUTO DIFF WBC: CPT | Performed by: FAMILY MEDICINE

## 2025-01-06 PROCEDURE — 80061 LIPID PANEL: CPT | Performed by: FAMILY MEDICINE

## 2025-01-06 PROCEDURE — 83036 HEMOGLOBIN GLYCOSYLATED A1C: CPT | Performed by: FAMILY MEDICINE

## 2025-01-06 PROCEDURE — 80053 COMPREHEN METABOLIC PANEL: CPT | Performed by: FAMILY MEDICINE

## 2025-01-06 PROCEDURE — 84439 ASSAY OF FREE THYROXINE: CPT | Performed by: FAMILY MEDICINE

## 2025-01-06 PROCEDURE — 84443 ASSAY THYROID STIM HORMONE: CPT | Performed by: FAMILY MEDICINE

## 2025-01-06 PROCEDURE — 82784 ASSAY IGA/IGD/IGG/IGM EACH: CPT | Performed by: FAMILY MEDICINE

## 2025-01-06 RX ORDER — ACYCLOVIR 400 MG/1
400 TABLET ORAL 2 TIMES DAILY
Qty: 180 TABLET | Refills: 0 | Status: SHIPPED | OUTPATIENT
Start: 2025-01-06

## 2025-01-28 ENCOUNTER — TELEPHONE (OUTPATIENT)
Dept: SPORTS MEDICINE | Facility: CLINIC | Age: 35
End: 2025-01-28
Payer: COMMERCIAL

## 2025-01-28 DIAGNOSIS — Z71.3 NUTRITIONAL COUNSELING: Primary | ICD-10-CM

## 2025-01-28 NOTE — TELEPHONE ENCOUNTER
----- Message from Dietitibecca Sotelo sent at 1/28/2025  1:13 PM CST -----  Regarding: nutrition referral  Good Afternoon    Can you please enter a Nutrition Consult referral ([id number]) for Sheldon Sanon for Nutritional Counseling.  Thank you!    Deepak Chan RD

## 2025-02-13 ENCOUNTER — OFFICE VISIT (OUTPATIENT)
Dept: URGENT CARE | Facility: CLINIC | Age: 35
End: 2025-02-13
Payer: COMMERCIAL

## 2025-02-13 VITALS
WEIGHT: 201 LBS | SYSTOLIC BLOOD PRESSURE: 124 MMHG | OXYGEN SATURATION: 99 % | BODY MASS INDEX: 29.77 KG/M2 | HEART RATE: 97 BPM | RESPIRATION RATE: 18 BRPM | TEMPERATURE: 98 F | HEIGHT: 69 IN | DIASTOLIC BLOOD PRESSURE: 70 MMHG

## 2025-02-13 DIAGNOSIS — B97.89 ACUTE VIRAL SINUSITIS: Primary | ICD-10-CM

## 2025-02-13 DIAGNOSIS — J01.90 ACUTE VIRAL SINUSITIS: Primary | ICD-10-CM

## 2025-02-13 DIAGNOSIS — J02.9 SORE THROAT: ICD-10-CM

## 2025-02-13 LAB
CTP QC/QA: YES
CTP QC/QA: YES
POC MOLECULAR INFLUENZA A AGN: NEGATIVE
POC MOLECULAR INFLUENZA B AGN: NEGATIVE
SARS CORONAVIRUS 2 ANTIGEN: NEGATIVE

## 2025-02-13 RX ORDER — PROMETHAZINE HYDROCHLORIDE AND DEXTROMETHORPHAN HYDROBROMIDE 6.25; 15 MG/5ML; MG/5ML
5 SYRUP ORAL NIGHTLY PRN
Qty: 118 ML | Refills: 0 | Status: SHIPPED | OUTPATIENT
Start: 2025-02-13 | End: 2025-02-23

## 2025-02-13 RX ORDER — BROMPHENIRAMINE MALEATE, PSEUDOEPHEDRINE HYDROCHLORIDE, AND DEXTROMETHORPHAN HYDROBROMIDE 2; 30; 10 MG/5ML; MG/5ML; MG/5ML
5 SYRUP ORAL EVERY 6 HOURS PRN
Qty: 118 ML | Refills: 0 | Status: SHIPPED | OUTPATIENT
Start: 2025-02-13 | End: 2025-02-23

## 2025-02-13 NOTE — PATIENT INSTRUCTIONS
Prescriptions sent to pharmacy:  Promethazine DM- cough suppressant- take at night time as needed for cough. May cause drowsiness    Bromphed- decongestant- take every 6 hours as needed for sinus congestion      Please drink plenty of fluids.  Please get plenty of rest.  Nasal irrigation with a saline spray or Netti Pot like device per their directions is also recommended.  If you  smoke, please stop smoking.    If not allergic, take Tylenol (Acetaminophen) 650 mg to  1 g every 6 hours as needed  and/or Motrin (Ibuprofen) 600 to 800 mg every 6 hours as needed for fever or pain.      Please remember that you have received care at an urgent care today. Urgent cares are not emergency rooms and are not equipped to handle life threatening emergencies and cannot rule in or out certain medical conditions and you may be released before all of your medical problems are known or treated.     Please arrange follow up with your primary care physician or speciality clinic within 2-5 days if your signs and symptoms have not resolved or worsen.     Patient can call our Referral Hotline at [phone] to make an appointment.      Please return here or go to the Emergency Department for any concerns or worsening of condition.  Signs of infection. These include a fever of 100.4°F (38°C) or higher, chills, cough, more sputum or change in color of sputum.  You are having so much trouble breathing that you can only say one or two words at a time.  You need to sit upright at all times to be able to breathe and or cannot lie down.  You have trouble breathing when talking or sitting still.  You have a fever of 100.4°F (38°C) or higher or chills.  You have chest pain when you cough, have trouble breathing but can still talk in full sentences, or cough up blood.

## 2025-02-13 NOTE — PROGRESS NOTES
"Subjective:      Patient ID: Sheldon Sanno is a 34 y.o. male.    Vitals:  height is 5' 9" (1.753 m) and weight is 91.2 kg (201 lb). His oral temperature is 97.8 °F (36.6 °C). His blood pressure is 124/70 and his pulse is 97. His respiration is 18 and oxygen saturation is 99%.     Chief Complaint: Sore Throat (Entered by patient)    34 yr old male came in with complaints of a headache, runny nose, sore throat and fatigue. His symptoms started Tuesday.     Sore Throat   This is a new problem. The current episode started in the past 7 days. The problem has been gradually worsening. There has been no fever. The pain is at a severity of 5/10. The pain is mild. Associated symptoms include congestion, coughing and headaches. Pertinent negatives include no abdominal pain, diarrhea, drooling, ear discharge, ear pain, hoarse voice, plugged ear sensation, neck pain, shortness of breath, stridor, swollen glands, trouble swallowing or vomiting. He has tried acetaminophen for the symptoms.       HENT:  Positive for congestion and sore throat. Negative for ear pain, ear discharge, drooling and trouble swallowing.    Neck: Negative for neck pain.   Respiratory:  Positive for cough. Negative for shortness of breath and stridor.    Gastrointestinal:  Negative for abdominal pain, vomiting and diarrhea.   Neurological:  Positive for headaches.      Objective:     Physical Exam   Constitutional: He is oriented to person, place, and time. He appears well-developed. He is cooperative.  Non-toxic appearance. He does not appear ill. No distress.   HENT:   Head: Normocephalic and atraumatic.   Ears:   Right Ear: Hearing, external ear and ear canal normal. A middle ear effusion is present.   Left Ear: Hearing, external ear and ear canal normal. A middle ear effusion is present.   Nose: Rhinorrhea and congestion present. No mucosal edema or nasal deformity. No epistaxis. Right sinus exhibits maxillary sinus tenderness and frontal sinus " tenderness. Left sinus exhibits maxillary sinus tenderness and frontal sinus tenderness.   Mouth/Throat: Uvula is midline, oropharynx is clear and moist and mucous membranes are normal. No trismus in the jaw. Normal dentition. No uvula swelling. No oropharyngeal exudate, posterior oropharyngeal edema or posterior oropharyngeal erythema.   Eyes: Conjunctivae and lids are normal. No scleral icterus.   Neck: Trachea normal and phonation normal. Neck supple. No edema present. No erythema present. No neck rigidity present.   Cardiovascular: Normal rate, regular rhythm, normal heart sounds and normal pulses.   Pulmonary/Chest: Effort normal and breath sounds normal. No stridor. No respiratory distress. He has no decreased breath sounds. He has no wheezes. He has no rhonchi. He has no rales. He exhibits no tenderness.   Abdominal: Normal appearance.   Musculoskeletal: Normal range of motion.         General: No deformity. Normal range of motion.   Neurological: He is alert and oriented to person, place, and time. He exhibits normal muscle tone. Coordination normal.   Skin: Skin is warm, dry, intact, not diaphoretic and not pale.   Psychiatric: His speech is normal and behavior is normal. Judgment and thought content normal.   Nursing note and vitals reviewed.      Assessment:     1. Acute viral sinusitis    2. Sore throat      Results for orders placed or performed in visit on 02/13/25   SARS Coronavirus 2 Antigen, POCT Manual Read    Collection Time: 02/13/25 11:44 AM   Result Value Ref Range    SARS Coronavirus 2 Antigen Negative Negative, Presumptive Negative     Acceptable Yes    POCT Influenza A/B MOLECULAR    Collection Time: 02/13/25 11:45 AM   Result Value Ref Range    POC Molecular Influenza A Ag Negative Negative    POC Molecular Influenza B Ag Negative Negative     Acceptable Yes        Plan:       Acute viral sinusitis  -     brompheniramine-pseudoeph-DM (BROMFED DM) 2-30-10 mg/5  mL Syrp; Take 5 mLs by mouth every 6 (six) hours as needed (congestion).  Dispense: 118 mL; Refill: 0  -     promethazine-dextromethorphan (PROMETHAZINE-DM) 6.25-15 mg/5 mL Syrp; Take 5 mLs by mouth nightly as needed (cough).  Dispense: 118 mL; Refill: 0    Sore throat  -     SARS Coronavirus 2 Antigen, POCT Manual Read  -     POCT Influenza A/B MOLECULAR        Patient Instructions   Prescriptions sent to pharmacy:  Promethazine DM- cough suppressant- take at night time as needed for cough. May cause drowsiness    Bromphed- decongestant- take every 6 hours as needed for sinus congestion      Please drink plenty of fluids.  Please get plenty of rest.  Nasal irrigation with a saline spray or Netti Pot like device per their directions is also recommended.  If you  smoke, please stop smoking.    If not allergic, take Tylenol (Acetaminophen) 650 mg to  1 g every 6 hours as needed  and/or Motrin (Ibuprofen) 600 to 800 mg every 6 hours as needed for fever or pain.      Please remember that you have received care at an urgent care today. Urgent cares are not emergency rooms and are not equipped to handle life threatening emergencies and cannot rule in or out certain medical conditions and you may be released before all of your medical problems are known or treated.     Please arrange follow up with your primary care physician or speciality clinic within 2-5 days if your signs and symptoms have not resolved or worsen.     Patient can call our Referral Hotline at [phone] to make an appointment.      Please return here or go to the Emergency Department for any concerns or worsening of condition.  Signs of infection. These include a fever of 100.4°F (38°C) or higher, chills, cough, more sputum or change in color of sputum.  You are having so much trouble breathing that you can only say one or two words at a time.  You need to sit upright at all times to be able to breathe and or cannot lie down.  You have trouble  breathing when talking or sitting still.  You have a fever of 100.4°F (38°C) or higher or chills.  You have chest pain when you cough, have trouble breathing but can still talk in full sentences, or cough up blood.

## 2025-03-18 ENCOUNTER — NUTRITION (OUTPATIENT)
Dept: NUTRITION | Facility: CLINIC | Age: 35
End: 2025-03-18
Payer: COMMERCIAL

## 2025-03-18 DIAGNOSIS — Z71.3 NUTRITIONAL COUNSELING: Primary | ICD-10-CM

## 2025-03-18 PROCEDURE — 99999 PR PBB SHADOW E&M-EST. PATIENT-LVL I: CPT | Mod: PBBFAC,,, | Performed by: NUTRITIONIST

## 2025-03-18 PROCEDURE — 97802 MEDICAL NUTRITION INDIV IN: CPT | Mod: S$GLB,,, | Performed by: NUTRITIONIST

## 2025-03-18 NOTE — PROGRESS NOTES
"Nutrition Assessment    Visit Type: Insurance initial  Session Time:  1 Hour 30 Minutes  Reason for MNT visit: Pt in for education and nutrition counseling regarding weight loss    Age: 34 y.o.  Wt:   Wt Readings from Last 1 Encounters:   02/13/25 91.2 kg (201 lb)     Ht:   Ht Readings from Last 1 Encounters:   02/13/25 5' 9" (1.753 m)     BMI:   BMI Readings from Last 1 Encounters:   02/13/25 29.68 kg/m²       Client states:  3.18.2025: He noticed once he turned around 31 years old his body's metabolism has changed. His goal is weight loss and is sustainable for himself. He loves to cook, but time is an issue. He does work out with a  3 days per week for 1 hour. He is on his feet most of the day by working with [employer].     Medical History  Problem List           Resolved    ADHD (Chronic)         Anxiety         Contact with and (suspected) exposure to other viral communicable diseases         Depression         Low testosterone in male         Left hip pain         Bowel habit changes            Past Medical History:   Diagnosis Date    Acne     ADD (attention deficit disorder)        Past Surgical History:   Procedure Laterality Date    none            Medications    Prior to Admission medications    Medication Sig Start Date End Date Taking? Authorizing Provider   ABSORICA 20 mg capsule Take 40 mg by mouth once daily. 12/16/24   Provider, Historical   acyclovir (ZOVIRAX) 400 MG tablet Take 1 tablet (400 mg total) by mouth 2 (two) times daily. 1/6/25   Danilo Bolden MD   buPROPion (WELLBUTRIN XL) 300 MG 24 hr tablet Take 1 tablet (300 mg total) by mouth once daily. 6/24/24 3/21/25  Terrie Rangel MD   CHORIONIC GONADOTROPIN, HUMAN INJ Inject 0.5 mLs into the skin twice a week.    Provider, Historical   dextroamphetamine-amphetamine (ADDERALL XR) 20 MG 24 hr capsule Take 1 capsule (20 mg total) by mouth every morning. 10/18/24   Asael Ventura MD   testosterone cypionate " (DEPOTESTOTERONE CYPIONATE) 100 mg/mL injection Inject 80 mg into the muscle twice a week.    Provider, Historical   venlafaxine (EFFEXOR-XR) 37.5 MG 24 hr capsule Take 1 capsule (37.5 mg total) by mouth once daily. 6/24/24 3/21/25  Terrie Rangel MD        Vitamins, Minerals, and/or Supplements:   turmeric gummies, [place] fish oil    Food Allergies or Intolerances:  NKFA     Social History    Marital status:  Single    Social History     Tobacco Use    Smoking status: Never     Passive exposure: Never    Smokeless tobacco: Never   Substance Use Topics    Alcohol use: Yes     Alcohol/week: 4.0 standard drinks of alcohol     Types: 4 Cans of beer per week     Current Alcohol use: yes, but is cutting back to where he drinks at special occasions     Lab Reports:  Reviewed and noted     Lab Results   Component Value Date    LXSIQBEI10AG 44 05/02/2017    TSH 0.863 01/06/2025    FREET4 0.95 01/06/2025    AST 28 01/06/2025    ALT 43 01/06/2025    HDL 36 (L) 01/06/2025    LDLCALC 127.0 01/06/2025    TRIG 190 (H) 01/06/2025    HGBA1C 5.1 01/06/2025         BP Readings from Last 1 Encounters:   02/13/25 124/70        24-hour Recall: Reviewed and noted during consult    Beverages:  Water: plenty  Hanh energy  Coffee: black (~3 cups per day)    LIFESTYLE FACTORS    Dinning out: 3-4 x per week    Meal preparation/shopping: self    Sleep: good    Energy Level: decent    Stress Level: moderate    Support System:  friends    Exercise Regimen: Moderately active (moderate intensity, 3-5 days a week)      Diagnosis    Excessive energy intake related to eating too many calories from eating out/uber eats as evidenced by 24 hour recall.      Intervention    Estimated Energy Requirements:   Calories: ~2,400 kcal  Protein: 180 grams  Carbohydrates: 160-180 grams  Fats: ~110 grams; Focus more on plant-based heart healthy fats  Total fluid: 100 oz + sweat loss  Limit added sugar to 29 grams or less per day (Note: 1 teaspoon of sugar = ~5  grams)    Recommendations & Goals:  Patient goals and recommendations are tailored to the specific patient's needs, readiness to change, lifestyle, culture, skills, resources, & abilities. Strategies to help achieve these nutrition-related goals were discussed which can include but are not limited to SMART goal setting & mindful eating.     Aim for a minimum of 7 hours sleep   Exercise 60 minutes most days  Eat breakfast within 1-2 hours of waking up  Try not to skip any meals or snacks, not going more than 3-4 hours without eating   At each meal and snack, try to include a source of fiber + lean protein + healthy fat     Written Materials Provided  These resources are intended to assist the patient in making it easier to choose recommended options when eating out & to identify better-for-you brands at the grocery store:    Meal Planning Guide with recommendations discussed along with portion sizes and a customized meal plan   Fueling Well On-the-Go Food Guide  Eat Fit Shopping Guide  Lifestyle Nutrition Meal Guide  RD contact information    Goals:   Eat (or drink protein) every 3-4 hours. A snack is needed between meals where you are going >4 hours (Setting alarms on your phone may initially help as a reminder)  Food is fuel for your body  Eating often helps boost metabolism  Helps preserve lean muscle  Helps with portion control  Focus on understanding at most how many carbohydrate servings I recommend for each meal and snack with dinner being a bit lower in carbs. The remainder of your plate will consist of lean protein and non-starchy veggies  Portion Control:    -Measure and/or weigh your recommended (cooked) portions when you start your meal plan. See what each portion looks like on your plate, then eyeball portions for future meals  Be on the lookout for Eat Fit options at [employer]. Order 1, 'fun' item and then the rest of the day Eat Fit options.  Deep fried foods: Consume at most twice per month  Planning  is key. Continue bringing an ice chest  MyFitness Pal/Lose It! (I reocmmend tracking foods for a few weeks)  Increase non-starchy veggies and move to a place in your fridge where it's in sight  Continue weight bearing exercises  Jazz Fest: Have 1 'fun' item and the rest of your day Eat Fit menu options      Monitoring/Evaluation    Monitor the following:  Weight  Sleep  Stress Management  Movement  Nutrient intake in reference to meal plan    Communicated with healthcare provider and documented plan for referral to appropriate agency/healthcare provider as needed    Supervising Physician: Glen Rosado MD    Patient motivation, anticipated barriers, expected compliance: Patient is motivated and has verbalized understanding and intent to comply.     Comprehension: good     Follow-up: 6-8 weeks

## 2025-03-19 NOTE — PATIENT INSTRUCTIONS
Name: Sheldon Sanon  Date: 3.18.2025  Nutrition protocol    Recommended Daily Energy Requirements to Reach Your Goals:  Calories: ~2,400 kcal  Protein: 180 grams  Carbohydrates: 160-180 grams  Fats: ~110 grams; Focus more on plant-based heart healthy fats  Total fluid: 100 oz + sweat loss  Limit added sugar to 29 grams or less per day (Note: 1 teaspoon of sugar = ~5 grams)    Goals:  Eat (or drink protein) every 3-4 hours. A snack is needed between meals where you are going >4 hours (Setting alarms on your phone may initially help as a reminder)  Food is fuel for your body  Eating often helps boost metabolism  Helps preserve lean muscle  Helps with portion control  Focus on understanding at most how many carbohydrate servings I recommend for each meal and snack with dinner being a bit lower in carbs. The remainder of your plate will consist of lean protein and non-starchy veggies  Portion Control:    -Measure and/or weigh your recommended (cooked) portions when you start your meal plan. See what each portion looks like on your plate, then eyeball portions for future meals  Be on the lookout for Eat Fit options at [place]. Order 1, 'fun' item and then the rest of the day Eat Fit options.  Deep fried foods: Consume at most twice per month  Planning is cristobal. Continue bringing an ice chest  MyFitness Pal/Lose It! (I reocmmend tracking foods for a few weeks)  Increase non-starchy veggies and move to a place in your fridge where it's in sight  Continue weight bearing exercises  [place]: Have 1 'fun' item and the rest of your day Eat Fit menu options          Pre-workout:  ¼ cup Baez Ancient Grains granola + 2 tablespoons peanut butter  Homemade overnight oats: ½ cup raw oats with Fairlife milk + 2 tablespoons peanut butter + ½ cup fruit of your choice + 1 tablespoons Ronak or Flax seeds + 2-3 tablespoons Vital Proteins Collagen Peptides, unflavored    Breakfast: 2 servings of carbohydrates = 30-40 grams + at  least 30 grams lean protein/heart healthy fats  Your protein smoothie at [facility], but instead of oat milk use regular milk    Snack: A snack is recommended if going more than 3-4 hours between meals  Helps increase Energy, curb cravings, and assist with portion control    Lunch: 7 ounces lean protein + any amount of non-starchy veggies + up to 3 servings of carbohydrates = 45-60 grams  Examples of 3 servings of carbohydrates = ~45-60 grams:  2 cups cooked lentil pasta -or- 1.5 cups cooked whole wheat pasta; 1 cup cooked brown rice; 1 medium potato or sweet potato (5-6 oz in weight) + piece of fruit; 1 piece of fruit + 2 slices 100-calorie bread; ½ cup corn + 1 cup cooked mashed potatoes; 1 cup cooked beans + 1/3 cup cooked brown rice, etc  The rest of your plate will consist of 7 oz lean protein + at least 1 cup cooked non-starchy veggies  Lunch Option Examples:  [place]: 2 slices Kit's Killer 21 whole grains & seeds regular sliced bread + 7 oz freshly sliced turkey + 2 tablespoons regular mayonnaise + 1 slice of cheese + non-starchy veggies of your choice + piece of fruit  Spaghetti & meat sauce: 2 cups cooked lentil pasta + 5 oz 93/7 lean ground beef + low sugar red sauce (see brands below) + side of non-starchy veggies  Red Beans & Rice: 1 cup cooked beans + 1/3 cup cooked brown rice + additional 4-5 oz lean protein (ex: chicken sausage, ham, chicken, etc) + side of non-starchy veggies  7 oz lean protein + at least 1 cup cooked non-starchy veggies + 1 medium baked potato + piece of fruit  Mediterranean Chicken Bowl: ½ cup cooked brown rice/quinoa + 7 oz chicken (without skin) + 3 tablespoons hummus + 2-3 tablespoons feta cheese + ½ cup salad greens, unlimited chopped red onion, cucumber, cherry tomatoes, and/or bell peppers + 1-2 tablespoons Extra Virgin Olive Oil + red wine vinegar + lemon juice  Seasoning blend: ½ teaspoon salt, ½ teaspoon pepper, 1 teaspoon garlic powder, 1 teaspoon onion powder, 1 teaspoon  rosemary, 1 teaspoon oregano, 1 teaspoon thyme, and ½ teaspoon cayenne pepper  7 oz portion of Rodriguez's Natural's Foods (5-minute heat & eat entrée) over 1 cup cooked brown rice + side of non-starchy veggies  Fuel Café at [facility]: 1 Eat Fit Grab -n- [place] item + piece of fruit on the side with 1-2 tablespoons peanut butter with fruit  Restaurants: See tips below, particularly the Pick 1 out of the 4 rule as well as info on Eat Fit restaurant partners  Fast Food  Convenient Options: Refer to Fueling Well On-The-Go Guide       Snack: ~1 serving of carbohydrates = 15-25 grams + at least 25 grams lean protein/heart healthy fat + any amount of non-starchy vegetables  Greek yogurt parfait:   8 ounces 0 or 2% plain Greek yogurt  ½ cup fresh fruit of your choice   Add a small amount of healthy fat: 2 tablespoons Ancient Grains granola   Optional additional plant-based sweetener: (see brands below)  Flavored Greek Yogurt (see brand examples below) x 2 + ¼ cup nuts -or- low sugar granola  Flapjacked protein mighty muffin (found on baked goods aisle) + ready to drink protein drink  [place]: 1-2 slices 100% whole grain bread + 1 tablespoon mayonnaise + 1 slice cheese + 4 oz lean protein topped with non-starchy veggies of your choice  Protein Bar (see brand examples below) + piece of fruit + 3 oz beef jerky  1 cup 2% cottage cheese + 1 piece fruit  Avocado toast: ½ small Stroud avocado on 1 slice 100% whole grain toast + MeetingSense Software Core Power Elite 42 gram protein ready to drink protein drink  2 portions of high protein crunchy snack (See brands below) + 3 oz beef jerky      Dinner: Lower carbohydrate à Aim for ~ 1 serving of carbohydrates = 15-20 grams = ~½ cup cooked whole grain starch -OR- 1 piece of fruit  Remainder of your plate will consist of 8 oz lean protein + 1 cup or more non-starchy veggies cooked in ~1 tablespoon plant-based fat (e.g., Avocado Oil, Extra Virgin Olive Oil)  Carbohydrate Swaps if Desired:  House Foods Tofu  Martha Buckley (found in produce section)   Hearts of Palm-Based 'Linguini'  Palmini  Riced Cauliflower  Riced Broccoli  Cauliflower Mash   Spaghetti Squash  Zoodles  Beets Zoodles  Crepini Egg Wraps  Outer Aisle Gourmet: Frozen cauliflower pizza crusts -or- sandwich thins   Low Carb Breads (See brands below)  Think outside the box for low carb dishes:  Kabobs, stir viveros made with riced cauliflower or riced broccoli, stuffed bell peppers with no rice, lettuce wraps, eggplant lasagna, shrimp etouffee made with riced cauliflower, request a sushi roll that contains raw fish to be made without rice, etc      Optional Post-Dinner Snack or Sweet: Anything up to 200 calories  'Fun size'    Dining Out   -[facility] Eat [facility]   Designed to take the guesswork out of dining out healthfully, [facility] makes the healthy choice the easy choice  Visit [email or web address]   Order the 'CarbonFlow Cookbook  Revised, Updated, Expanded' to create restaurant quality dishes at home  Craft: The Eat Fit Guide to Zero Proof Cocktails  Download the [facility] Eat Fit elis for free on your smartphone  List of all Eat Fit restaurant partners & dishes by location  Nutrition facts included for all Eat Fit dishes  200 + Eat Fit approved recipes  Eat Fit grocery shopping guides + community wellness resources      Restaurant Tips      Pick 1 out of the 4 Rule: Instead of eating bread/tortilla chips, an appetizer, alcoholic drink, and dessert, choose just one to have with your entrée   Focus on lean cuts of protein: Refer to lean meat/meat substitutes page in meal planning guidebook  Select items grilled, baked, broiled, braised, poached, or roasted       For your heart health, avoid crispy, crunchy, breaded, paneed or stuffed items and items that are cream based, au gratin or buttered       Order sauces, dressings, and gravies on the side. This way you can add 1-2 tablespoons yourself. This helps with portion control      You can  request double non-starchy vegetables instead of a starchy side dish. If the starchy side is something you love, consider splitting it with someone else at the table      Beverages: Order water with lemon, sparkling water, or unsweetened tea. Avoid sugary beverages such as soft drinks, juices, and mixers   Deep fried foods: Enjoy no more than twice a month            Building A Better Smoothie     Choose your Protein: Pick 1 from the following:  Plant-Based Protein Powder: 1.5 scoops  Orgain   Sunwarrior   Organic Waste Management RAW   Truvani  Whey Protein Powder: 1.5 scoops  Ascent  Garden of Life Sport Grass Fed Whey   Whey Natural  Collagen Peptides: ~8 tablespoons (4 tablespoons = 20 grams protein)  Vital Proteins Collagen Peptides, unflavored  Organic Grass Fed Collagen Peptides  Make it Sweet:   Add ~1 cup fresh -or- No Sugar Added  Unsweetened frozen fruit    Add your Veggies:   Instead of ice, choose frozen plain cauliflower florets    Cauliflower helps with the detoxification process in our bodies, and it's virtually tasteless!   Greens: Spinach, kale, or other leafy greens  Beets are a great addition to smoothies, especially paired with strawberries and lemon   Cucumbers and celery are refreshing ways to enhance nutrition and hydration within your smoothie   Add in a Plant-Based Fat:   Fats are a great way to keep us feeling satisfied, but they are also calorically dense, meaning a little bit goes a long way  Pick 1 from one of the following:  Nut Butter: 1-2 tablespoons  Natural Peanut Butter  [place] Butter  Cashew Butter  SunButter  Avocado (½ Stroud)  Avocado Oil  Extra Virgin Olive Oil: 1-2 tablespoons  Add Liquid to Reach your Desired Consistency:  Unsweetened/No Sugar Added Plant-Based Milk:    [place], Hemp, Cashew, Coconut  Fairlife Milk: Skim or Reduced Fat  Final Additions for an Extra Nutritional Boost: ½ tablespoon  Flaxseeds  Ronak Seeds  Hemp Hearts         Ordering a Better Salad   Include a  lean protein, any amount of non-starchy vegetables, and a small amount of fat   Order salad dressings on the side to better control portion sizes   Add ~2 tablespoons yourself to lightly coat   Pick 2 salad add-ins, each being ~2-3 tablespoons:   Dressing   Cheese   NutsSeeds   AvocadoGuacamole   Be mindful of some hidden high calorie additions:  Croutons  Dried Fruit        Additional Nutrition Principles     -Sleep: Aim for 7-9 hours' sleep nightly    -Recommendations for physical activity to stay healthy:   Aim for at least 150 minutes per week of moderate-intensity aerobic activity   Aim for at least 2 days per week of muscle-strengthening activity   Post weight/resistance training: Be sure to consume at least 20 grams protein within 1-hour post workout   Reminder: Slow and steady wins the race. Start small and let the healthy changes grow    - Luis #7 Rule: Guidelines for food brands found in the aisles of grocery stores   Look for brands that contain < 7 grams added sugar and  > 7 grams protein      -Frozen Meal Guidelines:    Aim for meals that contain 45 grams or less of carbohydrates & 20 grams or more protein   If you find a brand you enjoy that doesn't provide 20 grams protein, you can add leftover lean protein to the frozen meal or enjoy on the side  See notes below for several brand examples   Refer to the Eat Fit Shopping Guide for additional brand specific recommendations      -Portion Control:   Measure and/or weigh your recommended (cooked) portions when you start your meal plan   See what each portion looks like on your plate, then eyeball portions for future meals and snacks      -Foods to limit, as they contribute to inflammation and can decrease your Energy:   Added Sugar   Refined Carbohydrates   Alcohol   Deep Fried Foods   Ultra-processed Foods    -If you're having trouble finding a specific food brand, try looking on the brands website. Most companies have a 'store   find a  store' on their website         Better-For-You Food Brands      - Whole Grain Breads:   Kit's Killer Bread - 21 Whole Grain & Seeds, PowerSeed   Thin Slice -or- Regular Slice   Recommend Storing in Refrigerator  Look For 100% Whole Wheat/Whole Grain Bread Options  Low Carb Breads: Freezer Aisle  Base Culture 7 Nut & Seed   Carbonaut: Seeded Low Carb Keto Bread  Unbun: Low Carb Buns, Breads & Bagels     -Whole Grain Tortillas  Wraps:   Corn    Siete: Grain Free Tortillas  Austen Annemarie 100% Whole Wheat Tortillas     -High Protein Pasta's:   Banza Chickpea Pasta's:    Mac-n-Cheese   Pasta's - All Varieties    Red lentil  Yellow Lentil Pasta   Black Bean Pasta   Edamame-Based Pasta   Barilla: Chickpea & Red Lentil Pasta     -Rice:   Brown Rice  Available in easy 10-minute boil in a bag option  Banza Chickpea Rice   RightRice: Made from Vegetables  RightRice   Risotto   -Whole Grain Pancake Mix:   [place] Cakes Power Cakes à 100% Whole Grain Buttermilk Flapjack & Waffle Mix   FlapJacked: Protein Pancake & Baking Mix       -Grab & Go Protein Muffin:   FlapJacked: Mighty Muffin - Any Flavor [Found on Baking Aisle]      -Frozen Waffles:   [place] Power Waffles  Van's Power Grains Protein Waffles   Banza Protein Waffles     -Frozen Breakfast Sandwiches  Bowls for On-The-Go:   Hamilton Burleson' on English Muffin   Hamilton Burleson' Egg'Wich- The Breadless Breakfast   Hamilton Burleson' Frittatas    Veggies Made Great: Spinach Egg White Frittata  Tomato Basil Frittata   Serving Size: 2-3 frittata's  [place]: Egg White Omelets  CedarLane: Egg White Frittatas     -Cold Cereals:   Manuela Crunch Keto-Friendly Cereals   :ratio KETO Friendly Cereal   Three Wishes   Premier Protein   Special K ZERO   Special K PROTEIN  Magic Spoon Grain-Free Cereals      -Granola:   Health Nut by Good Granoly  Engine 2 Original Granola   :ratio KETO Friendly Toasted [place] Granola      -Grab & Go Oatmeal Cups:   Powerful Overnight Oats    [place]: GF Oatmeal with Flax and Ronak   Wild Friends Oats & Nut Butter   thinkThin Protein & Fiber Hot Oatmeal    -Oatmeal Packets:  Truvia Sweet Mornings Oatmeal (12 grams protein)  [place] Peak Oatmeal (20 grams protein)     -Whole Grain Chips:   SunChips   Beanito's  Beanfields Pollack Chips   Siete Grain Free Tortilla Chips      - Protein Chips/Crunchy Snacks:   iwon Organics Protein Stix  Protein Puffs   Quest Protein Chips  The Only Bean Crunchy Roasted Edamame Beans (Available in Snacks Pack Sizes)  Whisps: Cheese Crisps - Any Flavor (13-grams protein per serving)  [place] Creamery: Crisps- Any Flavor (13-grams protein per serving)  Hippeas Chickpeas Puffs - Any Flavor  Manuela Crunch Snack Mix (10-grams protein per ½ cup serving)     -Whole Grain Crackers:   Triscuit Thin Crisps  [place] Nut Thins   Iris's Gone Crackers   Crunchmaster Protein Sea Salt Cracker      -Protein Bars:   Barebells Protein Bar  KIND Protein  Rx Bar  Rx Layers Bar  Bulletproof Collagen Protein Bar  Oatmega Bars   :ratio KETO Protein Bar   IQ Bar     -Ready-to-Drink Protein Drinks:   [email or web address] CORE POWER Elite 42-gram Protein Drink (preferred one)  [email or web address] 30-gram Protein Drink   Iconic Grass-Fed Protein Drink   Orgain Clean Plant-Based Protein Drink   OWYN Plant-Based Protein Drink   KOIA Plant Powered Nutrition Drink  KOIA 'Keto' Protein Drink      -Frozen Meals - Single Servings:   Healthy Choice:  Max Bowls  Power Bowls   Zero Bowls  Simply Line  Realgood Co: Real Enchilada's   Quest Thin Crust Pizza - Low Carb  Banza Roasted Veggie Pizza  Kaylee's Kitchen Tofu Scramble with Veggies and Hashbrowns    -Better-For-You Frozen Chicken Tenders:  Masood Blackened Chicken Breast Tenders  CauliPower: New Chick on the Block- Chicken Tenders   [place] Naturals: Chicken Breast Tenders  Just Bare: Lightly Breaded Chicken Breast Strips/Chunks  Realgood Foods Co: Lightly Breaded Chicken Nuggets    -Frozen Meals  Bulk Low Carb  Options:   Michael'flour Foods: Keto Lasagna   Realgood: Low Carb Lightly Breaded Chicken Strips & Nuggets   Nature's Intent: Baked Cauli & Cheese - [employer] only  Just Bare: Lightly Breaded Chicken Breast Strips & Bites -[employer] only     -Rodriguez's Natural Foods:  Heat and Eat Entrees    Located in refrigerated section of most grocery stores & [employer]    -Giuseppe's Chicken Marsala pre-made refrigerated option found at [employer]    - Flavored Greek Yogurt:   Oikos Pro 20g Protein Greek Yogurt (most popular)  Oikos Triple Zero Greek Yogurt  :ratio 25g Protein Greek Yogurt  Two Good - All varieties       - Red Sauce in a Jar:   Stalin & Aubrie's Heart Smart Sauce    Classico Original   Engine 2 Plant-Strong   López's Homemade Red Sauces: Roasted Garlic Pizza, Pizza Arrabbiata, Homemade Pizza Sauce, Mae Pizza Sauce    -BBQ Sauce:    Yvonne's All-Natural Bar-B-Q Sauce  Primal Kitchen Classic BBQ Sauce  2 Sister's BBQ Sauce      -Better-For-You Ice Cream  Frozen Desserts:   Halo Top: Regular & Keto Series, Pops  Enlightened: Ice Cream + Bars  KETO Ice Cream: Ice Cream + Bars  Rebel: Ice Cream + Dessert Sandwiches  Ronald Creameries  Yasso Frozen Greek Yogurt Bar     -Marietta's Sweets: 70% Cocoa Dark Chocolate    -Collagen Peptides:  Great pantry staple: can be added to soups, hummus, coffee, etc to make higher protein   Favorite Brands:  Vital Proteins Collagen Peptides, Unflavored  Organic Grass Fed Collagen Peptides     -Natural Plant-Based Sweeteners:  Swerve:   Granular, Confectioners and Brown Sugar Replacers  Baking: Measures cup-for-cup like sugar  Stevia [~150-200 times sweeter than sugar]  Monkfruit [~200 times sweeter than sugar]  Allulose [~70% as sweet as sugar]  Liquid Brand Example: Wholesome Allulose Syrup  Truvia    -Electrolyte-Rich Hydration:   LMNT [1,000 mg sodium, 200 mg potassium, 10 calories, 2 grams carbs, 0 sugar; sweetened with Stevia]   Body Armor LYTE Drink  MyHy ZERO [500 mg sodium, 80 mg potassium, 0  calories, 0 carbs, 0 sugar; sweetened with Stevia]  Nuun Sport Hydration [300 mg sodium, 150 mg potassium, 16 calories, 4 grams carbs, 1 gram sugar; sweetened with Stevia]  Liquid IV Sugar-Free [530 mg sodium, 380 mg potassium, 20 calories, 5 grams carbs, 0 grams sugar; sweetened with Allulose and Stevia]     - All-Natural Water Enhancers:  TRUE Lemon Crystallized Juice Packets   Stur - Flavored Water Drops & Stick Packs [Whole Foods -or- Stur Drinks]  Ultima Electrolyte Replenisher - Stick Packets  SweetLeaf - Stevia Sweet Drops, Liquid Monk Fruit Sweetener Drops, Water drops     -Beverages:  Hint  All Varieties   Kombucha (e.g. Big Easy Bucha)  Poppi Prebiotic Soda  Olipop Prebiotic Soda  Zevia Soft Drink      -Supplements:   Vitamin D3: 2,000 international units per day    Fish Oil: Baez Fish Oil is great  Multivitamin   Cornell Light   Puremilia's Pride ABC Plus Multi for Adults   Chris One a Day  Centrum Silver  Creatine Monohydrate: 5 grams per day (Brand example = Ascent)        To schedule or reschedule a nutrition appointment, please call Lifestyle Nutrition Department within [facility] at [phone] -or- email [email or web address]

## 2025-04-29 ENCOUNTER — NUTRITION (OUTPATIENT)
Dept: NUTRITION | Facility: CLINIC | Age: 35
End: 2025-04-29
Payer: COMMERCIAL

## 2025-04-29 VITALS — HEIGHT: 69 IN | WEIGHT: 195 LBS | BODY MASS INDEX: 28.88 KG/M2

## 2025-04-29 DIAGNOSIS — Z71.3 NUTRITIONAL COUNSELING: Primary | ICD-10-CM

## 2025-04-29 PROCEDURE — 99999 PR PBB SHADOW E&M-EST. PATIENT-LVL II: CPT | Mod: PBBFAC,,, | Performed by: NUTRITIONIST

## 2025-04-29 PROCEDURE — 97803 MED NUTRITION INDIV SUBSEQ: CPT | Mod: S$GLB,,, | Performed by: NUTRITIONIST

## 2025-04-29 NOTE — PROGRESS NOTES
"Nutrition Assessment    Visit Type: Insurance follow-up  Session Time:  47 minutes  Reason for MNT visit: Pt in for education and nutrition counseling regarding weight loss    Age: 34 y.o.  Wt:   Wt Readings from Last 1 Encounters:   04/29/25 88.5 kg (195 lb)     Ht:   Ht Readings from Last 1 Encounters:   04/29/25 5' 9" (1.753 m)     BMI:   BMI Readings from Last 1 Encounters:   04/29/25 28.80 kg/m²       Client states:    4.29.2025 follow-up: Weight is down 6 pounds, but clothes fit much looser. He is cooking more at home and meal prepping at home. He is focusing more on portion control. He found bootylicious protein muffins. He is still busy at work. Biggest challenge is finding enough time to eat often due to this being his busy season. He decreased his beer intake drastically.    3.18.2025: He noticed once he turned around 31 years old his body's metabolism has changed. His goal is weight loss and is sustainable for himself. He loves to cook, but time is an issue. He does work out with a  3 days per week for 1 hour. He is on his feet most of the day by working with [employer].     Medical History  Problem List           Resolved    ADHD (Chronic)         Anxiety         Contact with and (suspected) exposure to other viral communicable diseases         Depression         Low testosterone in male         Left hip pain         Bowel habit changes            Past Medical History:   Diagnosis Date    Acne     ADD (attention deficit disorder)        Past Surgical History:   Procedure Laterality Date    none            Medications    Prior to Admission medications    Medication Sig Start Date End Date Taking? Authorizing Provider   ABSORICA 20 mg capsule Take 40 mg by mouth once daily. 12/16/24   Provider, Historical   acyclovir (ZOVIRAX) 400 MG tablet Take 1 tablet (400 mg total) by mouth 2 (two) times daily. 1/6/25   Danilo Bolden MD   buPROPion (WELLBUTRIN XL) 300 MG 24 hr tablet Take 1 tablet (300 mg " total) by mouth once daily. 6/24/24 3/21/25  Terrie Rangel MD   CHORIONIC GONADOTROPIN, HUMAN INJ Inject 0.5 mLs into the skin twice a week.    Provider, Historical   dextroamphetamine-amphetamine (ADDERALL XR) 20 MG 24 hr capsule Take 1 capsule (20 mg total) by mouth every morning. 10/18/24   Asael Ventura MD   testosterone cypionate (DEPOTESTOTERONE CYPIONATE) 100 mg/mL injection Inject 80 mg into the muscle twice a week.    Provider, Historical   venlafaxine (EFFEXOR-XR) 37.5 MG 24 hr capsule Take 1 capsule (37.5 mg total) by mouth once daily. 6/24/24 3/21/25  Terrie Rangel MD        Vitamins, Minerals, and/or Supplements:   turmeric gummies, [place] fish oil    Food Allergies or Intolerances:  NKFA     Social History    Marital status:  Single    Social History     Tobacco Use    Smoking status: Never     Passive exposure: Never    Smokeless tobacco: Never   Substance Use Topics    Alcohol use: Yes     Alcohol/week: 4.0 standard drinks of alcohol     Types: 4 Cans of beer per week     Current Alcohol use: yes, but is cutting back to where he drinks at special occasions     Lab Reports:  Reviewed and noted     Lab Results   Component Value Date    RPRFYCPS07XM 44 05/02/2017    TSH 0.863 01/06/2025    FREET4 0.95 01/06/2025    AST 28 01/06/2025    ALT 43 01/06/2025    HDL 36 (L) 01/06/2025    LDLCALC 127.0 01/06/2025    TRIG 190 (H) 01/06/2025    HGBA1C 5.1 01/06/2025         BP Readings from Last 1 Encounters:   02/13/25 124/70        24-hour Recall: Reviewed and noted during consult    Beverages:  Water: plenty  Hanh energy  Coffee: black (~3 cups per day)    LIFESTYLE FACTORS    Dinning out: 3-4 x per week    Meal preparation/shopping: self    Sleep: good    Energy Level: decent    Stress Level: moderate    Support System:  friends    Exercise Regimen: Moderately active (moderate intensity, 3-5 days a week)      Diagnosis    Excessive energy intake related to eating too many calories from  eating out/uber eats as evidenced by 24 hour recall.      Intervention    Estimated Energy Requirements:   Calories: ~2,400 kcal  Protein: 180 grams  Carbohydrates: 160-180 grams  Fats: ~110 grams; Focus more on plant-based heart healthy fats  Total fluid: 100 oz + sweat loss  Limit added sugar to 29 grams or less per day (Note: 1 teaspoon of sugar = ~5 grams)    Recommendations & Goals:  Patient goals and recommendations are tailored to the specific patient's needs, readiness to change, lifestyle, culture, skills, resources, & abilities. Strategies to help achieve these nutrition-related goals were discussed which can include but are not limited to SMART goal setting & mindful eating.     Aim for a minimum of 7 hours sleep   Exercise 60 minutes most days  Eat breakfast within 1-2 hours of waking up  Try not to skip any meals or snacks, not going more than 3-4 hours without eating   At each meal and snack, try to include a source of fiber + lean protein + healthy fat     Written Materials Provided  These resources are intended to assist the patient in making it easier to choose recommended options when eating out & to identify better-for-you brands at the grocery store:    Meal Planning Guide with recommendations discussed along with portion sizes and a customized meal plan   Fueling Well On-the-Go Food Guide  Eat Fit Shopping Guide  Lifestyle Nutrition Meal Guide  RD contact information    Goals:   Eat (or drink protein) every 3-4 hours. A snack is needed between meals where you are going >4 hours (Setting alarms on your phone may initially help as a reminder)  Food is fuel for your body  Eating often helps boost metabolism  Helps preserve lean muscle  Helps with portion control  Focus on understanding at most how many carbohydrate servings I recommend for each meal and snack with dinner being a bit lower in carbs. The remainder of your plate will consist of lean protein and non-starchy veggies  Portion Control:     -Measure and/or weigh your recommended (cooked) portions when you start your meal plan. See what each portion looks like on your plate, then eyeball portions for future meals  Be on the lookout for Eat Fit options at [employer]. Order 1, 'fun' item and then the rest of the day Eat Fit options.  Deep fried foods: Consume at most twice per month  Planning is cristobal. Continue bringing an ice chest  MyFitness Pal/Lose It! (I reocmmend tracking foods for a few weeks)  Increase non-starchy veggies and move to a place in your fridge where it's in sight  Continue weight bearing exercises  [employer]: Have 1 'fun' item and the rest of your day Eat Fit menu options      Monitoring/Evaluation    Monitor the following:  Weight  Sleep  Stress Management  Movement  Nutrient intake in reference to meal plan    Communicated with healthcare provider and documented plan for referral to appropriate agency/healthcare provider as needed    Supervising Physician: Glen Rosado MD    Patient motivation, anticipated barriers, expected compliance: Patient is motivated and has verbalized understanding and intent to comply.     Comprehension: good     Follow-up: September 2025

## 2025-04-29 NOTE — PATIENT INSTRUCTIONS
-[employer] new product: Crisp Power: Protein Crisp Pretzels  -Manuela Snack Mix (also found at [employer], but not new)  -[employer]: Protein Popped Chips/Puffs  -When busy at work, keep protein snacks on hand (Beef Jerky, Crisp Power Crisp Pretzels, Quest chips, Quest Cookie, Fairlife protein shake, etc)  -Fairlife CORE POWER Elite 42 gram protein shakes are $2.80 at [employer]   -Continue eating every 3-4 hours  -If you need more carbs, fruit is easy and convenient   -Make sure you hydrate over the summer, especially when working. Even wearing a small camelback can work  -

## 2025-05-09 ENCOUNTER — OFFICE VISIT (OUTPATIENT)
Dept: OTOLARYNGOLOGY | Facility: CLINIC | Age: 35
End: 2025-05-09
Payer: COMMERCIAL

## 2025-05-09 VITALS
WEIGHT: 196.44 LBS | BODY MASS INDEX: 29.01 KG/M2 | SYSTOLIC BLOOD PRESSURE: 120 MMHG | DIASTOLIC BLOOD PRESSURE: 80 MMHG | HEART RATE: 96 BPM

## 2025-05-09 DIAGNOSIS — R09.81 NASAL CONGESTION: Primary | ICD-10-CM

## 2025-05-09 DIAGNOSIS — J34.89 NASAL DRYNESS: ICD-10-CM

## 2025-05-09 DIAGNOSIS — R49.9 CHANGE OF VOICE: ICD-10-CM

## 2025-05-09 PROCEDURE — 3079F DIAST BP 80-89 MM HG: CPT | Mod: CPTII,S$GLB,, | Performed by: OTOLARYNGOLOGY

## 2025-05-09 PROCEDURE — 3044F HG A1C LEVEL LT 7.0%: CPT | Mod: CPTII,S$GLB,, | Performed by: OTOLARYNGOLOGY

## 2025-05-09 PROCEDURE — 99204 OFFICE O/P NEW MOD 45 MIN: CPT | Mod: 25,S$GLB,, | Performed by: OTOLARYNGOLOGY

## 2025-05-09 PROCEDURE — 31575 DIAGNOSTIC LARYNGOSCOPY: CPT | Mod: S$GLB,,, | Performed by: OTOLARYNGOLOGY

## 2025-05-09 PROCEDURE — 3008F BODY MASS INDEX DOCD: CPT | Mod: CPTII,S$GLB,, | Performed by: OTOLARYNGOLOGY

## 2025-05-09 PROCEDURE — 1159F MED LIST DOCD IN RCRD: CPT | Mod: CPTII,S$GLB,, | Performed by: OTOLARYNGOLOGY

## 2025-05-09 PROCEDURE — 3074F SYST BP LT 130 MM HG: CPT | Mod: CPTII,S$GLB,, | Performed by: OTOLARYNGOLOGY

## 2025-05-09 PROCEDURE — 1160F RVW MEDS BY RX/DR IN RCRD: CPT | Mod: CPTII,S$GLB,, | Performed by: OTOLARYNGOLOGY

## 2025-05-09 PROCEDURE — 99999 PR PBB SHADOW E&M-EST. PATIENT-LVL III: CPT | Mod: PBBFAC,,, | Performed by: OTOLARYNGOLOGY

## 2025-05-09 NOTE — PROGRESS NOTES
History of Present Illness:   Sheldon Sanon is a 34 y.o. year old male evaluated in the Otolaryngology-Head and Neck Surgery Clinic at [facility]. The patient is self-referred for evaluation of  nasal problems.  Patient is a [employer] and reports new nasal obstruction interfering with his voice.  He reports about a 5 month history of nasal obstruction that is not lateralized.  He reports recent onset of starting to take Accutane and testosterone replacement and is not sure if this has had affect on his nasal mucosa.  He denies known prior allergic disease.  He does report that he feels his nasal mucosa is dry.  He denies any increased crusting or mucus.  He denies rhinorrhea.  He denies recent fracture or prior nasal surgeries.         Past Medical/Surgical History  Past Medical History:   Diagnosis Date    Acne     ADD (attention deficit disorder)      His  has a past surgical history that includes none.     Past Family/Social History  His family history includes Brain cancer in his sister; Cancer in his maternal grandmother, paternal grandmother, and sister; Lung cancer in his paternal grandmother; Lung disease in his maternal grandmother; No Known Problems in his father and mother; Stroke in his maternal grandfather.  He  reports that he has never smoked. He has never been exposed to tobacco smoke. He has never used smokeless tobacco. He reports current alcohol use of about 4.0 standard drinks of alcohol per week. He reports current drug use. Drug: Marijuana.     Medications/Allergies/Immunizations  His current medication(s) include:   Current Medications[1]     Allergies: Patient has no known allergies.     Immunizations:   Immunization History   Administered Date(s) Administered    COVID-19, MRNA, LN-S, PF (Pfizer) (Purple Cap) 03/14/2021, 04/04/2021    COVID-19, mRNA, LNP-S, PF (Moderna) Ages 12+ 11/08/2023    Influenza - Quadrivalent 11/23/2015    Influenza - Quadrivalent - PF *Preferred* (6 months  and older) 11/23/2015, 11/08/2023    Influenza - Trivalent - Fluarix, Flulaval, Fluzone, Afluria - PF 01/03/2025    MMR 01/31/1992, 01/26/1996    Meningococcal Conjugate (MCV4P) 07/22/2008    Td (ADULT) 07/22/2008    Tdap 01/07/2020         Review of Systems   Answers submitted by the patient for this visit:  Review of Symptoms Questionnaire  (Submitted on 5/9/2025)  None of these: Yes  Voice Change?: Yes  None of these : Yes  None of these: Yes  None of these : Yes  None of these: Yes  None of these: Yes  None of these: Yes  None of these : Yes  None of these: Yes  None of these : Yes  headaches: Yes  swollen glands: Yes  nervous/ anxious: Yes    All other systems are negative except for that listed in the HPI.      PHYSICAL EXAM:   Vital Signs:  /80 (BP Location: Right arm, Patient Position: Sitting)   Pulse 96   Wt 89.1 kg (196 lb 6.9 oz)   BMI 29.01 kg/m²      General:  Well-developed, well-nourished  Communication and Voice:  Clear pitch and clarity  Hearing: Hearing adequate for verbal communication bilaterally   Inspection:  Normocephalic and atraumatic without mass or lesion  Palpation:  Facial skeleton intact without bony stepoffs  Parotid Glands:  No mass or tenderness  Facial Strength:  Facial motility symmetric and full bilaterally  Pinna:  External ear intact and fully developed  External canal:  Canal is patent with intact skin  Tympanic Membrane:  Clear and mobile  External nose:  No scar or anatomic deformity  Internal Nose:  Septum intact and midline.    Dry mucosa but otherwiseNo edema, polyp, or rhinorrhea.  TMJ:  No pain to palpation with full mobility  Oral cavity, Lips, Teeth, and Gums:  Mucosa and teeth intact and viable, No lesions, masses or ulcers  Oropharynx: No erythema or exudate, no masses or ulcerations, non-obstructive tonsils  Nasopharynx:  No mass or lesion with intact mucosa  Hypopharynx:  Not well visualized secondary to gagging  Larynx:  Not well visualized secondary to  gagging  Neck, Trachea, Lymphatics:  Midline trachea without mass or lesion, no lymphadenopathy  Thyroid:  No mass or nodularity  Eyes: No nystagmus with equal extraocular motion bilaterally  Neuro/Psych/Balance: Patient oriented and appropriate in interaction;  Appropriate mood and affect;  Gait is intact with no imbalance; Cranial nerves I-XII are intact  Respiratory effort:  Equal inspiration and expiration without stridor  Peripheral Vascular:  Warm extremities with equal pulses     Procedure: Flexible fiberoptic nasopharyngoscopy/laryngoscopy   Indications: Need for detailed exam, hyperactive gag reflex, inadequate mirror visualization.  Surgeon: Agnieszka Jimenez MD  Procedure note/findings: After informed discussion of the risks, benefits, and alternatives after indications as noted above, a fiberoptic nasopharyngoscopy and laryngoscopy was recommended for above indications, and the patient consented to this. Nasal cavities were topically anesthetized and decongested with 4% lidocaine and Afrin solutions after which a flexible scope was easily advanced without difficulty into the left and right nasal cavities as well as into the nasopharynx. Nasal cavities were intact without gross mass or lesion. Nasopharynx, similarly, revealed no mass lesion or granularity. There was no ulceration. There was no gross asymmetry. Fossa of [place]mueller was intact bilaterally. The eustachian tube orifices were intact bilaterally and patent. Posterior and lateral nasal pharyngeal walls were intact and symmetric without ulceration, mass, or granularity. Scope was now advanced distally. Visible oropharynx including lateral walls and tongue base was clear without lesion. Larynx and hypopharynx were examined. Larynx was intact with normal true vocal cord mobility bilaterally. No discrete masses or lesions in any location. Hypopharynx was clear without asymmetry.  Airway was patent. The scope was then withdrawn and removed. He tolerated  this well.        ASSESSMENT:   1. Nasal congestion    2. Change of voice    3. Nasal dryness            PLAN:    I explained to the patient that on nasal be I do not see any significant allergic disease or rhinitis.  He does have dry mucosa with no septal deviation and no turbinate hypertrophy I explained functional cause of his nasal obstruction.  I recommended fecal nasal saline use.  I do not see any need for nasal steroids or other therapy at this point.  He will monitor to see if change of other medicines will affect his nasal airway.     I believe that Mr. Sanon has a good understanding of the issues involved and I answered all of his questions.     DISCLAIMER: This note was prepared with Listen Edition voice recognition transcription software. Garbled syntax, mangled pronouns, and other bizarre constructions may be attributed to that software system. While efforts were made to correct any mistakes made by this voice recognition program, some errors and/or omissions may remain in the note that were missed when the note was originally created.           [1]   Current Outpatient Medications   Medication Sig Dispense Refill    ABSORICA 20 mg capsule Take 40 mg by mouth once daily.      acyclovir (ZOVIRAX) 400 MG tablet Take 1 tablet (400 mg total) by mouth 2 (two) times daily. 180 tablet 0    CHORIONIC GONADOTROPIN, HUMAN INJ Inject 0.5 mLs into the skin twice a week.      dextroamphetamine-amphetamine (ADDERALL XR) 20 MG 24 hr capsule Take 1 capsule (20 mg total) by mouth every morning. 30 capsule 0    testosterone cypionate (DEPOTESTOTERONE CYPIONATE) 100 mg/mL injection Inject 80 mg into the muscle twice a week.      buPROPion (WELLBUTRIN XL) 300 MG 24 hr tablet Take 1 tablet (300 mg total) by mouth once daily. 90 tablet 1    venlafaxine (EFFEXOR-XR) 37.5 MG 24 hr capsule Take 1 capsule (37.5 mg total) by mouth once daily. 90 capsule 1     No current facility-administered medications for this visit.